# Patient Record
Sex: FEMALE | Race: BLACK OR AFRICAN AMERICAN
[De-identification: names, ages, dates, MRNs, and addresses within clinical notes are randomized per-mention and may not be internally consistent; named-entity substitution may affect disease eponyms.]

---

## 2017-01-10 ENCOUNTER — OTHER (OUTPATIENT)
Age: 67
End: 2017-01-10

## 2017-01-30 ENCOUNTER — OTHER (OUTPATIENT)
Age: 67
End: 2017-01-30

## 2017-03-01 ENCOUNTER — EMERGENCY (EMERGENCY)
Facility: HOSPITAL | Age: 67
LOS: 0 days | Discharge: LEFT BEFORE DISCHARGE | End: 2017-03-01
Attending: EMERGENCY MEDICINE | Admitting: EMERGENCY MEDICINE
Payer: COMMERCIAL

## 2017-03-01 VITALS
SYSTOLIC BLOOD PRESSURE: 127 MMHG | RESPIRATION RATE: 18 BRPM | HEIGHT: 65 IN | HEART RATE: 71 BPM | TEMPERATURE: 98 F | OXYGEN SATURATION: 100 % | WEIGHT: 160.06 LBS | DIASTOLIC BLOOD PRESSURE: 73 MMHG

## 2017-03-01 VITALS
RESPIRATION RATE: 16 BRPM | SYSTOLIC BLOOD PRESSURE: 122 MMHG | DIASTOLIC BLOOD PRESSURE: 79 MMHG | OXYGEN SATURATION: 99 % | HEART RATE: 78 BPM

## 2017-03-01 DIAGNOSIS — Z92.89 PERSONAL HISTORY OF OTHER MEDICAL TREATMENT: Chronic | ICD-10-CM

## 2017-03-01 DIAGNOSIS — K57.92 DIVERTICULITIS OF INTESTINE, PART UNSPECIFIED, WITHOUT PERFORATION OR ABSCESS WITHOUT BLEEDING: ICD-10-CM

## 2017-03-01 DIAGNOSIS — R07.9 CHEST PAIN, UNSPECIFIED: ICD-10-CM

## 2017-03-01 DIAGNOSIS — I10 ESSENTIAL (PRIMARY) HYPERTENSION: ICD-10-CM

## 2017-03-01 LAB
ALBUMIN SERPL ELPH-MCNC: 3.5 G/DL — SIGNIFICANT CHANGE UP (ref 3.3–5)
ALP SERPL-CCNC: 86 U/L — SIGNIFICANT CHANGE UP (ref 40–120)
ALT FLD-CCNC: 21 U/L — SIGNIFICANT CHANGE UP (ref 12–78)
ANION GAP SERPL CALC-SCNC: 9 MMOL/L — SIGNIFICANT CHANGE UP (ref 5–17)
APTT BLD: 30.7 SEC — SIGNIFICANT CHANGE UP (ref 27.5–37.4)
AST SERPL-CCNC: 16 U/L — SIGNIFICANT CHANGE UP (ref 15–37)
BASOPHILS # BLD AUTO: 0.1 K/UL — SIGNIFICANT CHANGE UP (ref 0–0.2)
BASOPHILS NFR BLD AUTO: 1.1 % — SIGNIFICANT CHANGE UP (ref 0–2)
BILIRUB SERPL-MCNC: 0.3 MG/DL — SIGNIFICANT CHANGE UP (ref 0.2–1.2)
BUN SERPL-MCNC: 11 MG/DL — SIGNIFICANT CHANGE UP (ref 7–23)
CALCIUM SERPL-MCNC: 9.2 MG/DL — SIGNIFICANT CHANGE UP (ref 8.5–10.1)
CHLORIDE SERPL-SCNC: 104 MMOL/L — SIGNIFICANT CHANGE UP (ref 96–108)
CO2 SERPL-SCNC: 27 MMOL/L — SIGNIFICANT CHANGE UP (ref 22–31)
CREAT SERPL-MCNC: 0.49 MG/DL — LOW (ref 0.5–1.3)
EOSINOPHIL # BLD AUTO: 0.1 K/UL — SIGNIFICANT CHANGE UP (ref 0–0.5)
EOSINOPHIL NFR BLD AUTO: 1.3 % — SIGNIFICANT CHANGE UP (ref 0–6)
GLUCOSE SERPL-MCNC: 94 MG/DL — SIGNIFICANT CHANGE UP (ref 70–99)
HCT VFR BLD CALC: 40.2 % — SIGNIFICANT CHANGE UP (ref 34.5–45)
HGB BLD-MCNC: 13.2 G/DL — SIGNIFICANT CHANGE UP (ref 11.5–15.5)
INR BLD: 0.97 RATIO — SIGNIFICANT CHANGE UP (ref 0.88–1.16)
LIDOCAIN IGE QN: 107 U/L — SIGNIFICANT CHANGE UP (ref 73–393)
LYMPHOCYTES # BLD AUTO: 2.8 K/UL — SIGNIFICANT CHANGE UP (ref 1–3.3)
LYMPHOCYTES # BLD AUTO: 32.5 % — SIGNIFICANT CHANGE UP (ref 13–44)
MCHC RBC-ENTMCNC: 28.9 PG — SIGNIFICANT CHANGE UP (ref 27–34)
MCHC RBC-ENTMCNC: 33 GM/DL — SIGNIFICANT CHANGE UP (ref 32–36)
MCV RBC AUTO: 87.8 FL — SIGNIFICANT CHANGE UP (ref 80–100)
MONOCYTES # BLD AUTO: 0.4 K/UL — SIGNIFICANT CHANGE UP (ref 0–0.9)
MONOCYTES NFR BLD AUTO: 4.9 % — SIGNIFICANT CHANGE UP (ref 2–14)
NEUTROPHILS # BLD AUTO: 5.3 K/UL — SIGNIFICANT CHANGE UP (ref 1.8–7.4)
NEUTROPHILS NFR BLD AUTO: 60.2 % — SIGNIFICANT CHANGE UP (ref 43–77)
NT-PROBNP SERPL-SCNC: 48 PG/ML — SIGNIFICANT CHANGE UP (ref 0–125)
PLATELET # BLD AUTO: 257 K/UL — SIGNIFICANT CHANGE UP (ref 150–400)
POTASSIUM SERPL-MCNC: 4.1 MMOL/L — SIGNIFICANT CHANGE UP (ref 3.5–5.3)
POTASSIUM SERPL-SCNC: 4.1 MMOL/L — SIGNIFICANT CHANGE UP (ref 3.5–5.3)
PROT SERPL-MCNC: 7.3 GM/DL — SIGNIFICANT CHANGE UP (ref 6–8.3)
PROTHROM AB SERPL-ACNC: 10.7 SEC — SIGNIFICANT CHANGE UP (ref 10–13.1)
RBC # BLD: 4.58 M/UL — SIGNIFICANT CHANGE UP (ref 3.8–5.2)
RBC # FLD: 12.3 % — SIGNIFICANT CHANGE UP (ref 10.3–14.5)
SODIUM SERPL-SCNC: 140 MMOL/L — SIGNIFICANT CHANGE UP (ref 135–145)
TROPONIN I SERPL-MCNC: <0.015 NG/ML — SIGNIFICANT CHANGE UP (ref 0.01–0.04)
WBC # BLD: 8.8 K/UL — SIGNIFICANT CHANGE UP (ref 3.8–10.5)
WBC # FLD AUTO: 8.8 K/UL — SIGNIFICANT CHANGE UP (ref 3.8–10.5)

## 2017-03-01 PROCEDURE — 93010 ELECTROCARDIOGRAM REPORT: CPT

## 2017-03-01 PROCEDURE — 71020: CPT | Mod: 26

## 2017-03-01 PROCEDURE — 99284 EMERGENCY DEPT VISIT MOD MDM: CPT

## 2017-03-01 RX ORDER — SODIUM CHLORIDE 9 MG/ML
3 INJECTION INTRAMUSCULAR; INTRAVENOUS; SUBCUTANEOUS ONCE
Qty: 0 | Refills: 0 | Status: COMPLETED | OUTPATIENT
Start: 2017-03-01 | End: 2017-03-01

## 2017-03-01 RX ADMIN — SODIUM CHLORIDE 3 MILLILITER(S): 9 INJECTION INTRAMUSCULAR; INTRAVENOUS; SUBCUTANEOUS at 17:00

## 2017-03-01 NOTE — ED STATDOCS - PROGRESS NOTE DETAILS
65 y/o F PMHx bowel resection d/t abcess and fistula in May, herinoraphy, HTN, presents to the ED c/o CP. The pt provides that she started having sharp left sided chest and shoulder and back pain, on exacerbation. No h/o sob, abd pain, nvd, headache, fever, chills, dizziness, dysuria or urinary incontinence. Furhter evaluation in Main ED

## 2017-03-01 NOTE — ED PROVIDER NOTE - CHPI ED SYMPTOMS NEG
no diaphoresis/no dizziness/no shortness of breath/no vomiting/no nausea/no syncope/no cough/no fever/no chills

## 2017-03-01 NOTE — ED PROVIDER NOTE - DETAILS:
I, Shereen Greco, performed the initial face to face bedside interview with this patient regarding history of present illness, review of symptoms and relevant past medical, social and family history.  I completed an independent physical examination.  I was the initial provider who evaluated this patient.  The history, relevant review of systems, past medical and surgical history, medical decision making, and physical examination was documented by the scribe in my presence and I attest to the accuracy of the documentation.

## 2017-03-01 NOTE — ED PROVIDER NOTE - MEDICAL DECISION MAKING DETAILS
Atypical CP, constant since Saturday, positional in nature. No risk factors or MI. Will check one set of cardiac enzymes and refer back to [facility] if negative.

## 2017-03-01 NOTE — ED PROVIDER NOTE - MUSCULOSKELETAL, MLM
Spine appears normal, range of motion is not limited. No pedal edema or calf tenderness. Tenderness over left trapezius and paracervical muscles and left lateral pectoral muscles.

## 2017-03-01 NOTE — ED PROVIDER NOTE - OBJECTIVE STATEMENT
65 y/o F with PMHx bowel resection d/t abscess and fistula in May, herniography, HTN, diverticulitis, presents to the ED c/o acute constant left sided CP radiating to left neck, left shoulder and left back x4 days. Pt states pain is worsened with movement. Pt states she saw a cardio NP today for x3 years of CP (different from the pain that started x4 days ago). Pt does states that she thought the pain was a pulled muscle but was worried. Pt states she took a valium this morning. Pt also states she smokes cigarettes .5 ppd. No N/V/D, fevers, palpitations, dizziness, SOB, abd pain, edema. PMD Dominik Frazier. Cardio [facility]. 65 y/o F with PMHx bowel resection d/t abscess and fistula in May, herniography, HTN, diverticulitis, fibromyalgia, presents to the ED c/o acute constant left sided CP radiating to left neck, left shoulder and left back x4 days. Pt states pain is worsened with movement. Pt states she saw a cardio NP today for x3 years of CP (different from the pain that started x4 days ago). Pt does states that she thought the pain was a pulled muscle after she switched to a heavier purse and carried some heavy groceries. but was worried. Pt states she took a valium this morning. Pt also states she smokes cigarettes .5 ppd. No N/V/D, fevers, palpitations, dizziness, SOB, abd pain, edema. PMD Dominik Frazier. Cardio [facility]. Pt states she has a h/o TWI and had a cath in 2002 and a stress 1.5 years ago, which were both negative. Denies travel/ leg pain/ calf swelling.

## 2017-03-01 NOTE — ED PROVIDER NOTE - PROGRESS NOTE DETAILS
I discussed preliminary results with patient and found a pink slip from ramonita's office requesting 2 sets of CE - I relayed this to patient and she states she does not want to wait for a 2nd set of enzymes given that her pain has been constant and seems musculoskeletal. I expressed desire for a 2nd set and stated that patient would need to signout AMA if she chose to leave prior to the 2nd set her cardiologist was recommending.  The pt is clinically sober, A&Ox3, free from distracting injury.  Throughout our interactions in the ED today, the pt has demonstrated concrete thinking/reasoning, has maintained an orderly/reasonable conversation, appears to have intact insight/judgment/reason, a sound mind and therefore in our opinion has capacity now to make decisions.  Given the pt’s presentation, we communicated (in laymen's terms) our concern for  acute coronary syndrome.  The pt verbalized an understanding of our worries.  We’ve communicated to the patient that the ED evaluation is incomplete & many troublesome conditions haven’t been r/o.  We have discussed the need for further ED w/u so we can get more information about the pt’s condition.  We have discussed the range of possible dx, potential testing & tx options.  Our discussions included the potential outcomes of leaving AMA, including worsening of their condition, becoming permanently disabled/in pain/critically ill, or death.  Despite these efforts, we were unable to convince the pt to stay.  The pt is refusing any further care and is leaving against medical advice. We have attempted to offer tx/rx/guidance for any dangerous conditions which are most likely and/or dangerous.  We have answered all questions and have implored the pt to return ASAP to complete the w/u.

## 2017-03-01 NOTE — ED ADULT NURSE NOTE - CHPI ED SYMPTOMS NEG
no fever/no shortness of breath/no diaphoresis/no vomiting/no nausea/no syncope/no back pain/no cough/no chills/no dizziness

## 2017-03-01 NOTE — ED PROVIDER NOTE - NS ED MD SCRIBE ATTENDING SCRIBE SECTIONS
HISTORY OF PRESENT ILLNESS/REVIEW OF SYSTEMS/PAST MEDICAL/SURGICAL/SOCIAL HISTORY/PHYSICAL EXAM/DISPOSITION HISTORY OF PRESENT ILLNESS/RESULTS/PAST MEDICAL/SURGICAL/SOCIAL HISTORY/DISPOSITION/PHYSICAL EXAM/REVIEW OF SYSTEMS

## 2017-03-01 NOTE — ED STATDOCS - NS ED MD SCRIBE ATTENDING SCRIBE SECTIONS
RESULTS/HISTORY OF PRESENT ILLNESS/PHYSICAL EXAM/PROGRESS NOTE/HIV/PAST MEDICAL/SURGICAL/SOCIAL HISTORY/CONSULTATIONS/SHIFT CHANGE/REVIEW OF SYSTEMS/DISPOSITION/INTAKE ASSESSMENT/SCREENINGS

## 2017-05-05 ENCOUNTER — APPOINTMENT (OUTPATIENT)
Dept: COLORECTAL SURGERY | Facility: CLINIC | Age: 67
End: 2017-05-05

## 2017-05-30 ENCOUNTER — APPOINTMENT (OUTPATIENT)
Dept: COLORECTAL SURGERY | Facility: CLINIC | Age: 67
End: 2017-05-30

## 2017-05-30 VITALS
DIASTOLIC BLOOD PRESSURE: 72 MMHG | WEIGHT: 173 LBS | SYSTOLIC BLOOD PRESSURE: 119 MMHG | BODY MASS INDEX: 28.82 KG/M2 | HEART RATE: 62 BPM | HEIGHT: 65 IN | TEMPERATURE: 97.9 F

## 2017-09-20 ENCOUNTER — RESULT REVIEW (OUTPATIENT)
Age: 67
End: 2017-09-20

## 2017-09-29 ENCOUNTER — OUTPATIENT (OUTPATIENT)
Dept: OUTPATIENT SERVICES | Facility: HOSPITAL | Age: 67
LOS: 1 days | End: 2017-09-29
Payer: MEDICARE

## 2017-09-29 ENCOUNTER — APPOINTMENT (OUTPATIENT)
Dept: RADIOLOGY | Facility: CLINIC | Age: 67
End: 2017-09-29
Payer: MEDICARE

## 2017-09-29 DIAGNOSIS — Z00.8 ENCOUNTER FOR OTHER GENERAL EXAMINATION: ICD-10-CM

## 2017-09-29 DIAGNOSIS — Z92.89 PERSONAL HISTORY OF OTHER MEDICAL TREATMENT: Chronic | ICD-10-CM

## 2017-09-29 PROCEDURE — 73030 X-RAY EXAM OF SHOULDER: CPT

## 2017-09-29 PROCEDURE — 73030 X-RAY EXAM OF SHOULDER: CPT | Mod: 26,RT

## 2017-10-18 ENCOUNTER — OUTPATIENT (OUTPATIENT)
Dept: OUTPATIENT SERVICES | Facility: HOSPITAL | Age: 67
LOS: 1 days | End: 2017-10-18
Payer: MEDICARE

## 2017-10-18 ENCOUNTER — APPOINTMENT (OUTPATIENT)
Dept: MRI IMAGING | Facility: CLINIC | Age: 67
End: 2017-10-18
Payer: MEDICARE

## 2017-10-18 DIAGNOSIS — Z00.8 ENCOUNTER FOR OTHER GENERAL EXAMINATION: ICD-10-CM

## 2017-10-18 DIAGNOSIS — Z92.89 PERSONAL HISTORY OF OTHER MEDICAL TREATMENT: Chronic | ICD-10-CM

## 2017-10-18 PROCEDURE — 73221 MRI JOINT UPR EXTREM W/O DYE: CPT

## 2017-10-18 PROCEDURE — 73221 MRI JOINT UPR EXTREM W/O DYE: CPT | Mod: 26,RT

## 2018-01-05 ENCOUNTER — APPOINTMENT (OUTPATIENT)
Dept: COLORECTAL SURGERY | Facility: CLINIC | Age: 68
End: 2018-01-05

## 2018-01-10 ENCOUNTER — APPOINTMENT (OUTPATIENT)
Dept: RADIOLOGY | Facility: CLINIC | Age: 68
End: 2018-01-10
Payer: MEDICARE

## 2018-01-10 ENCOUNTER — OUTPATIENT (OUTPATIENT)
Dept: OUTPATIENT SERVICES | Facility: HOSPITAL | Age: 68
LOS: 1 days | End: 2018-01-10
Payer: COMMERCIAL

## 2018-01-10 DIAGNOSIS — Z00.8 ENCOUNTER FOR OTHER GENERAL EXAMINATION: ICD-10-CM

## 2018-01-10 DIAGNOSIS — Z92.89 PERSONAL HISTORY OF OTHER MEDICAL TREATMENT: Chronic | ICD-10-CM

## 2018-01-10 PROCEDURE — 71046 X-RAY EXAM CHEST 2 VIEWS: CPT

## 2018-01-10 PROCEDURE — 71046 X-RAY EXAM CHEST 2 VIEWS: CPT | Mod: 26

## 2018-02-06 ENCOUNTER — OUTPATIENT (OUTPATIENT)
Dept: OUTPATIENT SERVICES | Facility: HOSPITAL | Age: 68
LOS: 1 days | Discharge: HOME | End: 2018-02-06
Payer: MEDICARE

## 2018-02-06 VITALS
SYSTOLIC BLOOD PRESSURE: 110 MMHG | HEIGHT: 64 IN | OXYGEN SATURATION: 100 % | WEIGHT: 171.08 LBS | TEMPERATURE: 98 F | RESPIRATION RATE: 18 BRPM | HEART RATE: 65 BPM | DIASTOLIC BLOOD PRESSURE: 61 MMHG

## 2018-02-06 DIAGNOSIS — I10 ESSENTIAL (PRIMARY) HYPERTENSION: ICD-10-CM

## 2018-02-06 DIAGNOSIS — Z92.89 PERSONAL HISTORY OF OTHER MEDICAL TREATMENT: Chronic | ICD-10-CM

## 2018-02-06 DIAGNOSIS — K58.9 IRRITABLE BOWEL SYNDROME WITHOUT DIARRHEA: ICD-10-CM

## 2018-02-06 DIAGNOSIS — K21.9 GASTRO-ESOPHAGEAL REFLUX DISEASE WITHOUT ESOPHAGITIS: ICD-10-CM

## 2018-02-06 DIAGNOSIS — Z96.7 PRESENCE OF OTHER BONE AND TENDON IMPLANTS: Chronic | ICD-10-CM

## 2018-02-06 DIAGNOSIS — Z98.890 OTHER SPECIFIED POSTPROCEDURAL STATES: Chronic | ICD-10-CM

## 2018-02-06 DIAGNOSIS — M75.121 COMPLETE ROTATOR CUFF TEAR OR RUPTURE OF RIGHT SHOULDER, NOT SPECIFIED AS TRAUMATIC: ICD-10-CM

## 2018-02-06 DIAGNOSIS — Z01.818 ENCOUNTER FOR OTHER PREPROCEDURAL EXAMINATION: ICD-10-CM

## 2018-02-06 DIAGNOSIS — M79.7 FIBROMYALGIA: ICD-10-CM

## 2018-02-06 DIAGNOSIS — M75.51 BURSITIS OF RIGHT SHOULDER: ICD-10-CM

## 2018-02-06 DIAGNOSIS — M25.811 OTHER SPECIFIED JOINT DISORDERS, RIGHT SHOULDER: ICD-10-CM

## 2018-02-06 DIAGNOSIS — M75.111 INCOMPLETE ROTATOR CUFF TEAR OR RUPTURE OF RIGHT SHOULDER, NOT SPECIFIED AS TRAUMATIC: ICD-10-CM

## 2018-02-06 LAB
ANION GAP SERPL CALC-SCNC: 6 MMOL/L — SIGNIFICANT CHANGE UP (ref 5–17)
BASOPHILS # BLD AUTO: 0.1 K/UL — SIGNIFICANT CHANGE UP (ref 0–0.2)
BASOPHILS NFR BLD AUTO: 1.5 % — SIGNIFICANT CHANGE UP (ref 0–2)
BUN SERPL-MCNC: 15 MG/DL — SIGNIFICANT CHANGE UP (ref 7–23)
CALCIUM SERPL-MCNC: 9 MG/DL — SIGNIFICANT CHANGE UP (ref 8.5–10.1)
CHLORIDE SERPL-SCNC: 108 MMOL/L — SIGNIFICANT CHANGE UP (ref 96–108)
CO2 SERPL-SCNC: 25 MMOL/L — SIGNIFICANT CHANGE UP (ref 22–31)
CREAT SERPL-MCNC: 0.43 MG/DL — LOW (ref 0.5–1.3)
EOSINOPHIL # BLD AUTO: 0.1 K/UL — SIGNIFICANT CHANGE UP (ref 0–0.5)
EOSINOPHIL NFR BLD AUTO: 1.3 % — SIGNIFICANT CHANGE UP (ref 0–6)
GLUCOSE SERPL-MCNC: 92 MG/DL — SIGNIFICANT CHANGE UP (ref 70–99)
HCT VFR BLD CALC: 38.2 % — SIGNIFICANT CHANGE UP (ref 34.5–45)
HGB BLD-MCNC: 12.5 G/DL — SIGNIFICANT CHANGE UP (ref 11.5–15.5)
LYMPHOCYTES # BLD AUTO: 2.3 K/UL — SIGNIFICANT CHANGE UP (ref 1–3.3)
LYMPHOCYTES # BLD AUTO: 34.6 % — SIGNIFICANT CHANGE UP (ref 13–44)
MCHC RBC-ENTMCNC: 28.7 PG — SIGNIFICANT CHANGE UP (ref 27–34)
MCHC RBC-ENTMCNC: 32.8 GM/DL — SIGNIFICANT CHANGE UP (ref 32–36)
MCV RBC AUTO: 87.6 FL — SIGNIFICANT CHANGE UP (ref 80–100)
MONOCYTES # BLD AUTO: 0.4 K/UL — SIGNIFICANT CHANGE UP (ref 0–0.9)
MONOCYTES NFR BLD AUTO: 6.8 % — SIGNIFICANT CHANGE UP (ref 2–14)
NEUTROPHILS # BLD AUTO: 3.7 K/UL — SIGNIFICANT CHANGE UP (ref 1.8–7.4)
NEUTROPHILS NFR BLD AUTO: 55.9 % — SIGNIFICANT CHANGE UP (ref 43–77)
PLATELET # BLD AUTO: 218 K/UL — SIGNIFICANT CHANGE UP (ref 150–400)
POTASSIUM SERPL-MCNC: 4 MMOL/L — SIGNIFICANT CHANGE UP (ref 3.5–5.3)
POTASSIUM SERPL-SCNC: 4 MMOL/L — SIGNIFICANT CHANGE UP (ref 3.5–5.3)
RBC # BLD: 4.36 M/UL — SIGNIFICANT CHANGE UP (ref 3.8–5.2)
RBC # FLD: 12.5 % — SIGNIFICANT CHANGE UP (ref 10.3–14.5)
SODIUM SERPL-SCNC: 139 MMOL/L — SIGNIFICANT CHANGE UP (ref 135–145)
WBC # BLD: 6.5 K/UL — SIGNIFICANT CHANGE UP (ref 3.8–10.5)
WBC # FLD AUTO: 6.5 K/UL — SIGNIFICANT CHANGE UP (ref 3.8–10.5)

## 2018-02-06 PROCEDURE — 93010 ELECTROCARDIOGRAM REPORT: CPT

## 2018-02-06 NOTE — H&P PST ADULT - NSANTHOSAYNRD_GEN_A_CORE
No. LOVELY screening performed.  STOP BANG Legend: 0-2 = LOW Risk; 3-4 = INTERMEDIATE Risk; 5-8 = HIGH Risk

## 2018-02-06 NOTE — H&P PST ADULT - PMH
Bilateral recurrent inguinal hernia without obstruction or gangrene    Cysts of both ovaries    Depression    Diverticulitis    Gall bladder disease    Gastroesophageal reflux disease without esophagitis    Headache    Hematuria    Hemorrhoids    Herniated disc, cervical  and lumbar  HTN (hypertension)    Irritable bowel syndrome with diarrhea    Osteopenia    Pain  hand and wrist  Recurrent ventral hernia    Spondylosis    Spondylosis    Tear of right rotator cuff, unspecified tear extent    Uterine leiomyoma, unspecified location  History of  Yeast infection

## 2018-02-06 NOTE — H&P PST ADULT - HISTORY OF PRESENT ILLNESS
67 years old female with right shoulder for rotator cuff tear. Admits to pain to right shoulder since spring, 2017. Admits to progressive pain which is now constant and worst in the morning. pain radiates to forearm and to the right side of neck. Planned shoulder arthroscopy.

## 2018-02-06 NOTE — H&P PST ADULT - PSH
H/O abdominal hysterectomy    H/O bilateral oophorectomy    H/O microdiscectomy  x 2 , 2004  H/O ventral hernia repair    History of bowel resection  with ileostomy  History of cholecystectomy    S/P ORIF (open reduction internal fixation) fracture  right wrist

## 2018-02-06 NOTE — H&P PST ADULT - ASSESSMENT
Female present to PST prior to right shoulder arthroscopy, right rotator cuff repair with Dr. Khan.  Plan   1. NPO after midnight  2. Take the following medications with sips of water on the day of procedure: Lopressor  3. Use E-Z sponge as directed  4. Drink a quart of extra  fluids the day before your surgery.  5. Medical clearance with Dr. Frazier  6. CBC, BMP,  sent to lab  7. Chest x-ray on chart  8. EKG done

## 2018-02-06 NOTE — H&P PST ADULT - FAMILY HISTORY
Mother  Still living? No  Family history of melanoma, Age at diagnosis: Age Unknown  Family history of Alzheimer's disease, Age at diagnosis: Age Unknown     Father  Still living? No  Family history of cancer, Age at diagnosis: Age Unknown

## 2018-02-07 ENCOUNTER — OUTPATIENT (OUTPATIENT)
Dept: OUTPATIENT SERVICES | Facility: HOSPITAL | Age: 68
LOS: 1 days | End: 2018-02-07
Payer: MEDICARE

## 2018-02-07 ENCOUNTER — APPOINTMENT (OUTPATIENT)
Dept: MAMMOGRAPHY | Facility: CLINIC | Age: 68
End: 2018-02-07
Payer: MEDICARE

## 2018-02-07 ENCOUNTER — APPOINTMENT (OUTPATIENT)
Dept: ULTRASOUND IMAGING | Facility: CLINIC | Age: 68
End: 2018-02-07
Payer: MEDICARE

## 2018-02-07 DIAGNOSIS — Z96.7 PRESENCE OF OTHER BONE AND TENDON IMPLANTS: Chronic | ICD-10-CM

## 2018-02-07 DIAGNOSIS — Z98.890 OTHER SPECIFIED POSTPROCEDURAL STATES: Chronic | ICD-10-CM

## 2018-02-07 DIAGNOSIS — Z00.8 ENCOUNTER FOR OTHER GENERAL EXAMINATION: ICD-10-CM

## 2018-02-07 PROCEDURE — 77063 BREAST TOMOSYNTHESIS BI: CPT

## 2018-02-07 PROCEDURE — 76641 ULTRASOUND BREAST COMPLETE: CPT | Mod: 26,50

## 2018-02-07 PROCEDURE — 76641 ULTRASOUND BREAST COMPLETE: CPT

## 2018-02-07 PROCEDURE — 77067 SCR MAMMO BI INCL CAD: CPT | Mod: 26

## 2018-02-07 PROCEDURE — 0159T: CPT

## 2018-02-07 PROCEDURE — 77063 BREAST TOMOSYNTHESIS BI: CPT | Mod: 26

## 2018-02-07 PROCEDURE — 77067 SCR MAMMO BI INCL CAD: CPT

## 2018-02-09 NOTE — H&P ADULT - ASSESSMENT
67 year old female with PMHx HTN, back pain, active smoking, abdominal abscess, ventral hernias, presented to cardiologist for preop evaluation for shoulder surgery. Pt. had an abnormal PET scan. Pt. now referred for [facility] for further evaluation. [facility] risks, benefits and alternatives discussed with patient. Risk discussed included, but not limited to MI, stroke, mortality, major bleeding, arrythmia, or infection. Consent obtained and signed educational material provided. Pt. verbalizes and understands pre-procedural instructions.

## 2018-02-09 NOTE — H&P ADULT - HISTORY OF PRESENT ILLNESS
67 year old female with PMHx HTN, back pain, active smoking presented to cardiologist for preop evaluation. 67 year old female with PMHx HTN, back pain, active smoking, abdominal abscess, ventral hernias, presented to cardiologist for preop evaluation for shoulder surgery. Pt. had an abnormal PET scan. Pt. now referred for [facility] for further evaluation. Pt. denies chest pain, SOb, palpitations, lightheadedness, chills, fever.

## 2018-02-09 NOTE — H&P ADULT - NSHPREVIEWOFSYSTEMS_GEN_ALL_CORE
CONSTITUTIONAL: No fever, weight loss, or fatigue  EYES: No eye pain, visual disturbances, or discharge  ENMT:  No difficulty hearing, tinnitus, vertigo; No sinus or throat pain  NECK: No pain or stiffness  BREASTS: No pain, masses, or nipple discharge  RESPIRATORY: No cough, wheezing, chills or hemoptysis; No shortness of breath  CARDIOVASCULAR: No chest pain, palpitations, dizziness, or leg swelling  GASTROINTESTINAL: +GERD   GENITOURINARY: No dysuria, frequency, hematuria, or incontinence  NEUROLOGICAL: No headaches, memory loss, loss of strength, numbness, or tremors  SKIN: No itching, burning, rashes, or lesions   LYMPH NODES: No enlarged glands  ENDOCRINE: No heat or cold intolerance; No hair loss  MUSCULOSKELETAL: shoulder pain, knee pain,   PSYCHIATRIC: No depression, anxiety, mood swings, or difficulty sleeping  HEME/LYMPH: No easy bruising, or bleeding gums  ALLERGY AND IMMUNOLOGIC: No hives or eczema

## 2018-02-12 ENCOUNTER — OUTPATIENT (OUTPATIENT)
Dept: OUTPATIENT SERVICES | Facility: HOSPITAL | Age: 68
LOS: 1 days | Discharge: HOME | End: 2018-02-12
Payer: MEDICARE

## 2018-02-12 VITALS
DIASTOLIC BLOOD PRESSURE: 78 MMHG | WEIGHT: 169.98 LBS | HEIGHT: 64 IN | RESPIRATION RATE: 16 BRPM | OXYGEN SATURATION: 100 % | SYSTOLIC BLOOD PRESSURE: 145 MMHG | HEART RATE: 64 BPM

## 2018-02-12 VITALS
OXYGEN SATURATION: 96 % | SYSTOLIC BLOOD PRESSURE: 108 MMHG | RESPIRATION RATE: 16 BRPM | DIASTOLIC BLOOD PRESSURE: 83 MMHG | HEART RATE: 65 BPM

## 2018-02-12 DIAGNOSIS — Z98.890 OTHER SPECIFIED POSTPROCEDURAL STATES: Chronic | ICD-10-CM

## 2018-02-12 DIAGNOSIS — Z96.7 PRESENCE OF OTHER BONE AND TENDON IMPLANTS: Chronic | ICD-10-CM

## 2018-02-12 PROCEDURE — 93010 ELECTROCARDIOGRAM REPORT: CPT

## 2018-02-12 RX ORDER — SODIUM CHLORIDE 9 MG/ML
1000 INJECTION INTRAMUSCULAR; INTRAVENOUS; SUBCUTANEOUS
Qty: 0 | Refills: 0 | Status: DISCONTINUED | OUTPATIENT
Start: 2018-02-12 | End: 2018-02-27

## 2018-02-12 NOTE — CHART NOTE - NSCHARTNOTEFT_GEN_A_CORE
Nurse Practitioner Progress note:     HPI:    T(C): --  HR: 64 (02-12-18 @ 09:40) (63 - 64)  BP: 119/55 (02-12-18 @ 09:40) (119/55 - 145/78)  RR: 16 (02-12-18 @ 09:40) (16 - 16)  SpO2: 94% (02-12-18 @ 09:40) (94% - 100%)  Wt(kg): --    PHYSICAL EXAM:  Neurologic: Non-focal, AxOx3.  No neuro deficits  Vascular: Peripheral pulses palpable 2+ bilaterally  Procedure Site: Rt. groin perclose closure device noted site benign soft no bleeding no hematoma +1PP      PROCEDURE RESULTS:   Cardiac Cath Lab - Adult (02.12.18 @ 09:30) >   Diagnostic Conclusions   Normal coronary arteries.        ASSESSMENT/PLAN:     67 year old female with PMHx HTN, back pain, active smoking, abdominal abscess, ventral hernias, presented to cardiologist for preop evaluation for shoulder surgery. Pt. had an abnormal PET scan. S/P C     -Cleared for planned surgery.  -Manage with medical therapy.	  -VS, labs, diet, activity as per post cath orders  -IV hydration  -Encourage PO fluids  -Continue current medications  -Pt. to be discharged home today  -Plan of care D/W pt. and MD  -Post cath instructions reviewed with pt., pt. verbalizes and understands instructions  -Follow-up with attending

## 2018-02-16 ENCOUNTER — OUTPATIENT (OUTPATIENT)
Dept: OUTPATIENT SERVICES | Facility: HOSPITAL | Age: 68
LOS: 1 days | Discharge: HOME | End: 2018-02-16
Payer: MEDICARE

## 2018-02-16 ENCOUNTER — RESULT REVIEW (OUTPATIENT)
Age: 68
End: 2018-02-16

## 2018-02-16 VITALS
WEIGHT: 171.08 LBS | TEMPERATURE: 98 F | HEIGHT: 64 IN | OXYGEN SATURATION: 98 % | RESPIRATION RATE: 16 BRPM | SYSTOLIC BLOOD PRESSURE: 131 MMHG | HEART RATE: 63 BPM | DIASTOLIC BLOOD PRESSURE: 77 MMHG

## 2018-02-16 VITALS
HEART RATE: 70 BPM | RESPIRATION RATE: 16 BRPM | SYSTOLIC BLOOD PRESSURE: 137 MMHG | OXYGEN SATURATION: 100 % | DIASTOLIC BLOOD PRESSURE: 71 MMHG | TEMPERATURE: 98 F

## 2018-02-16 DIAGNOSIS — M75.101 UNSPECIFIED ROTATOR CUFF TEAR OR RUPTURE OF RIGHT SHOULDER, NOT SPECIFIED AS TRAUMATIC: ICD-10-CM

## 2018-02-16 DIAGNOSIS — Z98.890 OTHER SPECIFIED POSTPROCEDURAL STATES: Chronic | ICD-10-CM

## 2018-02-16 DIAGNOSIS — Z90.49 ACQUIRED ABSENCE OF OTHER SPECIFIED PARTS OF DIGESTIVE TRACT: ICD-10-CM

## 2018-02-16 DIAGNOSIS — E66.9 OBESITY, UNSPECIFIED: ICD-10-CM

## 2018-02-16 DIAGNOSIS — Z96.7 PRESENCE OF OTHER BONE AND TENDON IMPLANTS: Chronic | ICD-10-CM

## 2018-02-16 DIAGNOSIS — E78.00 PURE HYPERCHOLESTEROLEMIA, UNSPECIFIED: ICD-10-CM

## 2018-02-16 DIAGNOSIS — R93.1 ABNORMAL FINDINGS ON DIAGNOSTIC IMAGING OF HEART AND CORONARY CIRCULATION: ICD-10-CM

## 2018-02-16 DIAGNOSIS — K21.9 GASTRO-ESOPHAGEAL REFLUX DISEASE WITHOUT ESOPHAGITIS: ICD-10-CM

## 2018-02-16 DIAGNOSIS — Z01.810 ENCOUNTER FOR PREPROCEDURAL CARDIOVASCULAR EXAMINATION: ICD-10-CM

## 2018-02-16 DIAGNOSIS — K58.0 IRRITABLE BOWEL SYNDROME WITH DIARRHEA: ICD-10-CM

## 2018-02-16 DIAGNOSIS — I10 ESSENTIAL (PRIMARY) HYPERTENSION: ICD-10-CM

## 2018-02-16 DIAGNOSIS — M85.80 OTHER SPECIFIED DISORDERS OF BONE DENSITY AND STRUCTURE, UNSPECIFIED SITE: ICD-10-CM

## 2018-02-16 DIAGNOSIS — Z90.710 ACQUIRED ABSENCE OF BOTH CERVIX AND UTERUS: ICD-10-CM

## 2018-02-16 DIAGNOSIS — F17.210 NICOTINE DEPENDENCE, CIGARETTES, UNCOMPLICATED: ICD-10-CM

## 2018-02-16 PROCEDURE — 88304 TISSUE EXAM BY PATHOLOGIST: CPT | Mod: 26

## 2018-02-16 RX ORDER — ASPIRIN/CALCIUM CARB/MAGNESIUM 324 MG
1 TABLET ORAL
Qty: 14 | Refills: 0
Start: 2018-02-16 | End: 2018-03-01

## 2018-02-16 RX ORDER — OXYCODONE AND ACETAMINOPHEN 5; 325 MG/1; MG/1
1 TABLET ORAL
Qty: 30 | Refills: 0
Start: 2018-02-16

## 2018-02-16 RX ORDER — SODIUM CHLORIDE 9 MG/ML
1000 INJECTION INTRAMUSCULAR; INTRAVENOUS; SUBCUTANEOUS
Qty: 0 | Refills: 0 | Status: DISCONTINUED | OUTPATIENT
Start: 2018-02-16 | End: 2018-02-16

## 2018-02-16 RX ORDER — FENTANYL CITRATE 50 UG/ML
50 INJECTION INTRAVENOUS
Qty: 0 | Refills: 0 | Status: DISCONTINUED | OUTPATIENT
Start: 2018-02-16 | End: 2018-02-16

## 2018-02-16 RX ORDER — OXYCODONE HYDROCHLORIDE 5 MG/1
5 TABLET ORAL EVERY 4 HOURS
Qty: 0 | Refills: 0 | Status: DISCONTINUED | OUTPATIENT
Start: 2018-02-16 | End: 2018-02-16

## 2018-02-16 RX ORDER — ACETAMINOPHEN 500 MG
1000 TABLET ORAL ONCE
Qty: 0 | Refills: 0 | Status: COMPLETED | OUTPATIENT
Start: 2018-02-16 | End: 2018-02-16

## 2018-02-16 RX ORDER — ONDANSETRON 8 MG/1
4 TABLET, FILM COATED ORAL ONCE
Qty: 0 | Refills: 0 | Status: DISCONTINUED | OUTPATIENT
Start: 2018-02-16 | End: 2018-02-16

## 2018-02-16 RX ADMIN — Medication 400 MILLIGRAM(S): at 11:26

## 2018-02-16 NOTE — PROGRESS NOTE ADULT - SUBJECTIVE AND OBJECTIVE BOX
_Right_____ Interscalene Nerve Block Note:  Time out performed, pt awake and alert, sterile prep with chlorhexidine and drape, ultrasound-guided, 21G 2" stimuplex needle, good visualization of needle and nerve at all times, 20cc of 0.5% Ropivacaine and 10cc of 2% of Lidocaine injected easily, no heme after aspirating every 5cc, no intraneural injection, no paresthesia.  Procedure well tolerated without complications.

## 2018-02-16 NOTE — ASU DISCHARGE PLAN (ADULT/PEDIATRIC). - NOTIFY
Numbness, tingling/Numbness, color, or temperature change to extremity/Swelling that continues/Bleeding that does not stop/Fever greater than 101/Pain not relieved by Medications

## 2018-02-16 NOTE — ASU DISCHARGE PLAN (ADULT/PEDIATRIC). - ACTIVITY LEVEL
no weight bearing/Keep right arm in shoulder immobilizer at all times Sling as needed for comfort/weight bearing as tolerated

## 2018-02-16 NOTE — ASU DISCHARGE PLAN (ADULT/PEDIATRIC). - MEDICATION SUMMARY - MEDICATIONS TO TAKE
I will START or STAY ON the medications listed below when I get home from the hospital:    Percocet 5/325 oral tablet  -- 1 tab(s) by mouth every 4 hours, As Needed -for severe pain MDD:6  -- Caution federal law prohibits the transfer of this drug to any person other  than the person for whom it was prescribed.  May cause drowsiness.  Alcohol may intensify this effect.  Use care when operating dangerous machinery.  This prescription cannot be refilled.  This product contains acetaminophen.  Do not use  with any other product containing acetaminophen to prevent possible liver damage.  Using more of this medication than prescribed may cause serious breathing problems.    -- Indication: For prn pain    Ecotrin 325 mg oral delayed release tablet  -- 1 tab(s) by mouth once a day MDD:1  -- Swallow whole.  Do not crush.  Take with food or milk.    -- Indication: For dvt ppx    diazePAM 10 mg oral tablet  -- 1 tab(s) by mouth once a day (at bedtime), As Needed  -- Indication: For per pmd    fluconazole 150 mg oral tablet  -- 1 tab(s) by mouth once a week  -- Indication: For per pmd    Dyazide 37.5 mg-25 mg oral capsule  -- 1 cap(s) by mouth once a day (in the morning)  -- Indication: For per pmd    Lopressor  -- 25 milligram(s) by mouth once a day (in the morning)  -- Indication: For per pmd    PriLOSEC 40 mg oral delayed release capsule  -- 1 cap(s) by mouth once a day (in the morning)  -- Indication: For per pmd    Premarin 0.625 mg oral tablet  -- 1 tab(s) by mouth once a day (in the morning)  -- Indication: For per pmd    Vitamin D3 2000 intl units oral tablet  -- 1 tab(s) by mouth once a day  -- Indication: For per pmd    biotin 5000 mcg oral tablet, disintegrating  -- 1 tab(s) by mouth once a day  -- Indication: For per pmd

## 2018-02-16 NOTE — ASU PATIENT PROFILE, ADULT - VISION (WITH CORRECTIVE LENSES IF THE PATIENT USUALLY WEARS THEM):
Normal vision: sees adequately in most situations; can see medication labels, newsprint glasses for distance and reading/Partially impaired: cannot see medication labels or newsprint, but can see obstacles in path, and the surrounding layout; can count fingers at arm's length

## 2018-02-21 LAB — SURGICAL PATHOLOGY FINAL REPORT - CH: SIGNIFICANT CHANGE UP

## 2018-02-23 DIAGNOSIS — M50.30 OTHER CERVICAL DISC DEGENERATION, UNSPECIFIED CERVICAL REGION: ICD-10-CM

## 2018-02-23 DIAGNOSIS — M75.111 INCOMPLETE ROTATOR CUFF TEAR OR RUPTURE OF RIGHT SHOULDER, NOT SPECIFIED AS TRAUMATIC: ICD-10-CM

## 2018-02-23 DIAGNOSIS — I10 ESSENTIAL (PRIMARY) HYPERTENSION: ICD-10-CM

## 2018-02-23 DIAGNOSIS — R53.82 CHRONIC FATIGUE, UNSPECIFIED: ICD-10-CM

## 2018-02-23 DIAGNOSIS — M75.51 BURSITIS OF RIGHT SHOULDER: ICD-10-CM

## 2018-02-23 DIAGNOSIS — K58.9 IRRITABLE BOWEL SYNDROME WITHOUT DIARRHEA: ICD-10-CM

## 2018-02-23 DIAGNOSIS — M79.7 FIBROMYALGIA: ICD-10-CM

## 2018-02-23 DIAGNOSIS — Z90.710 ACQUIRED ABSENCE OF BOTH CERVIX AND UTERUS: ICD-10-CM

## 2018-02-23 DIAGNOSIS — Z90.49 ACQUIRED ABSENCE OF OTHER SPECIFIED PARTS OF DIGESTIVE TRACT: ICD-10-CM

## 2018-02-23 DIAGNOSIS — E78.2 MIXED HYPERLIPIDEMIA: ICD-10-CM

## 2018-02-23 DIAGNOSIS — E66.9 OBESITY, UNSPECIFIED: ICD-10-CM

## 2018-02-23 DIAGNOSIS — M25.811 OTHER SPECIFIED JOINT DISORDERS, RIGHT SHOULDER: ICD-10-CM

## 2018-02-23 DIAGNOSIS — Z90.721 ACQUIRED ABSENCE OF OVARIES, UNILATERAL: ICD-10-CM

## 2018-02-23 DIAGNOSIS — K21.9 GASTRO-ESOPHAGEAL REFLUX DISEASE WITHOUT ESOPHAGITIS: ICD-10-CM

## 2018-04-10 ENCOUNTER — APPOINTMENT (OUTPATIENT)
Dept: COLORECTAL SURGERY | Facility: CLINIC | Age: 68
End: 2018-04-10
Payer: MEDICARE

## 2018-04-10 VITALS
BODY MASS INDEX: 28.82 KG/M2 | DIASTOLIC BLOOD PRESSURE: 80 MMHG | SYSTOLIC BLOOD PRESSURE: 145 MMHG | HEIGHT: 65 IN | HEART RATE: 76 BPM | WEIGHT: 173 LBS

## 2018-04-10 DIAGNOSIS — K43.2 INCISIONAL HERNIA W/OUT OBSTRUCTION OR GANGRENE: ICD-10-CM

## 2018-04-10 DIAGNOSIS — R10.9 UNSPECIFIED ABDOMINAL PAIN: ICD-10-CM

## 2018-04-10 PROCEDURE — 99214 OFFICE O/P EST MOD 30 MIN: CPT

## 2018-04-17 ENCOUNTER — OUTPATIENT (OUTPATIENT)
Dept: OUTPATIENT SERVICES | Facility: HOSPITAL | Age: 68
LOS: 1 days | End: 2018-04-17
Payer: MEDICARE

## 2018-04-17 ENCOUNTER — APPOINTMENT (OUTPATIENT)
Dept: CT IMAGING | Facility: CLINIC | Age: 68
End: 2018-04-17
Payer: MEDICARE

## 2018-04-17 DIAGNOSIS — Z98.890 OTHER SPECIFIED POSTPROCEDURAL STATES: Chronic | ICD-10-CM

## 2018-04-17 DIAGNOSIS — Z96.7 PRESENCE OF OTHER BONE AND TENDON IMPLANTS: Chronic | ICD-10-CM

## 2018-04-17 DIAGNOSIS — Z00.8 ENCOUNTER FOR OTHER GENERAL EXAMINATION: ICD-10-CM

## 2018-04-17 PROCEDURE — 74177 CT ABD & PELVIS W/CONTRAST: CPT | Mod: 26

## 2018-04-17 PROCEDURE — 74177 CT ABD & PELVIS W/CONTRAST: CPT

## 2018-04-19 PROBLEM — K43.2 VENTRAL INCISIONAL HERNIA: Status: ACTIVE | Noted: 2017-06-04

## 2018-05-01 ENCOUNTER — OTHER (OUTPATIENT)
Age: 68
End: 2018-05-01

## 2018-09-08 PROBLEM — K57.92 DIVERTICULITIS OF INTESTINE, PART UNSPECIFIED, WITHOUT PERFORATION OR ABSCESS WITHOUT BLEEDING: Chronic | Status: ACTIVE | Noted: 2017-03-01

## 2018-09-08 PROBLEM — M47.9 SPONDYLOSIS, UNSPECIFIED: Chronic | Status: ACTIVE | Noted: 2018-02-06

## 2018-09-08 PROBLEM — R51 HEADACHE: Chronic | Status: ACTIVE | Noted: 2018-02-06

## 2018-09-08 PROBLEM — K40.21 BILATERAL INGUINAL HERNIA, WITHOUT OBSTRUCTION OR GANGRENE, RECURRENT: Chronic | Status: ACTIVE | Noted: 2018-02-06

## 2018-09-08 PROBLEM — R31.9 HEMATURIA, UNSPECIFIED: Chronic | Status: ACTIVE | Noted: 2018-02-06

## 2018-09-08 PROBLEM — M75.101 UNSPECIFIED ROTATOR CUFF TEAR OR RUPTURE OF RIGHT SHOULDER, NOT SPECIFIED AS TRAUMATIC: Chronic | Status: ACTIVE | Noted: 2018-02-06

## 2018-09-08 PROBLEM — K21.9 GASTRO-ESOPHAGEAL REFLUX DISEASE WITHOUT ESOPHAGITIS: Chronic | Status: ACTIVE | Noted: 2018-02-06

## 2018-09-08 PROBLEM — I10 ESSENTIAL (PRIMARY) HYPERTENSION: Chronic | Status: ACTIVE | Noted: 2017-03-01

## 2018-09-08 PROBLEM — B37.9 CANDIDIASIS, UNSPECIFIED: Chronic | Status: ACTIVE | Noted: 2018-02-06

## 2018-09-08 PROBLEM — F32.9 MAJOR DEPRESSIVE DISORDER, SINGLE EPISODE, UNSPECIFIED: Chronic | Status: ACTIVE | Noted: 2018-02-06

## 2018-09-08 PROBLEM — K58.0 IRRITABLE BOWEL SYNDROME WITH DIARRHEA: Chronic | Status: ACTIVE | Noted: 2018-02-06

## 2018-09-08 PROBLEM — N83.201 UNSPECIFIED OVARIAN CYST, RIGHT SIDE: Chronic | Status: ACTIVE | Noted: 2018-02-06

## 2018-09-08 PROBLEM — K64.9 UNSPECIFIED HEMORRHOIDS: Chronic | Status: ACTIVE | Noted: 2018-02-06

## 2018-09-08 PROBLEM — K43.2 INCISIONAL HERNIA WITHOUT OBSTRUCTION OR GANGRENE: Chronic | Status: ACTIVE | Noted: 2018-02-06

## 2018-09-08 PROBLEM — R52 PAIN, UNSPECIFIED: Chronic | Status: ACTIVE | Noted: 2018-02-06

## 2018-09-08 PROBLEM — M85.80 OTHER SPECIFIED DISORDERS OF BONE DENSITY AND STRUCTURE, UNSPECIFIED SITE: Chronic | Status: ACTIVE | Noted: 2018-02-06

## 2018-09-08 PROBLEM — M50.20 OTHER CERVICAL DISC DISPLACEMENT, UNSPECIFIED CERVICAL REGION: Chronic | Status: ACTIVE | Noted: 2018-02-06

## 2018-09-08 PROBLEM — K82.9 DISEASE OF GALLBLADDER, UNSPECIFIED: Chronic | Status: ACTIVE | Noted: 2018-02-06

## 2018-09-08 PROBLEM — D25.9 LEIOMYOMA OF UTERUS, UNSPECIFIED: Chronic | Status: ACTIVE | Noted: 2018-02-06

## 2018-09-12 ENCOUNTER — APPOINTMENT (OUTPATIENT)
Dept: RADIOLOGY | Facility: CLINIC | Age: 68
End: 2018-09-12
Payer: MEDICARE

## 2018-09-12 ENCOUNTER — OUTPATIENT (OUTPATIENT)
Dept: OUTPATIENT SERVICES | Facility: HOSPITAL | Age: 68
LOS: 1 days | End: 2018-09-12
Payer: MEDICARE

## 2018-09-12 ENCOUNTER — APPOINTMENT (OUTPATIENT)
Dept: MRI IMAGING | Facility: CLINIC | Age: 68
End: 2018-09-12
Payer: MEDICARE

## 2018-09-12 DIAGNOSIS — Z00.8 ENCOUNTER FOR OTHER GENERAL EXAMINATION: ICD-10-CM

## 2018-09-12 DIAGNOSIS — Z98.890 OTHER SPECIFIED POSTPROCEDURAL STATES: Chronic | ICD-10-CM

## 2018-09-12 DIAGNOSIS — Z96.7 PRESENCE OF OTHER BONE AND TENDON IMPLANTS: Chronic | ICD-10-CM

## 2018-09-12 PROCEDURE — 73610 X-RAY EXAM OF ANKLE: CPT

## 2018-09-12 PROCEDURE — 73610 X-RAY EXAM OF ANKLE: CPT | Mod: 26,RT

## 2018-09-12 PROCEDURE — 72148 MRI LUMBAR SPINE W/O DYE: CPT | Mod: 26

## 2018-09-12 PROCEDURE — 72148 MRI LUMBAR SPINE W/O DYE: CPT

## 2018-11-10 ENCOUNTER — APPOINTMENT (OUTPATIENT)
Dept: RADIOLOGY | Facility: CLINIC | Age: 68
End: 2018-11-10
Payer: MEDICARE

## 2018-11-10 ENCOUNTER — OUTPATIENT (OUTPATIENT)
Dept: OUTPATIENT SERVICES | Facility: HOSPITAL | Age: 68
LOS: 1 days | End: 2018-11-10
Payer: MEDICARE

## 2018-11-10 DIAGNOSIS — Z98.890 OTHER SPECIFIED POSTPROCEDURAL STATES: Chronic | ICD-10-CM

## 2018-11-10 DIAGNOSIS — Z96.7 PRESENCE OF OTHER BONE AND TENDON IMPLANTS: Chronic | ICD-10-CM

## 2018-11-10 DIAGNOSIS — Z00.8 ENCOUNTER FOR OTHER GENERAL EXAMINATION: ICD-10-CM

## 2018-11-10 PROCEDURE — 71046 X-RAY EXAM CHEST 2 VIEWS: CPT | Mod: 26

## 2018-11-10 PROCEDURE — 71046 X-RAY EXAM CHEST 2 VIEWS: CPT

## 2018-12-07 ENCOUNTER — APPOINTMENT (OUTPATIENT)
Dept: RADIOLOGY | Facility: CLINIC | Age: 68
End: 2018-12-07
Payer: MEDICARE

## 2018-12-07 ENCOUNTER — OUTPATIENT (OUTPATIENT)
Dept: OUTPATIENT SERVICES | Facility: HOSPITAL | Age: 68
LOS: 1 days | End: 2018-12-07
Payer: MEDICARE

## 2018-12-07 DIAGNOSIS — Z98.890 OTHER SPECIFIED POSTPROCEDURAL STATES: Chronic | ICD-10-CM

## 2018-12-07 DIAGNOSIS — Z96.7 PRESENCE OF OTHER BONE AND TENDON IMPLANTS: Chronic | ICD-10-CM

## 2018-12-07 DIAGNOSIS — Z00.8 ENCOUNTER FOR OTHER GENERAL EXAMINATION: ICD-10-CM

## 2018-12-07 PROCEDURE — 71046 X-RAY EXAM CHEST 2 VIEWS: CPT

## 2018-12-07 PROCEDURE — 71046 X-RAY EXAM CHEST 2 VIEWS: CPT | Mod: 26

## 2019-03-11 ENCOUNTER — EMERGENCY (EMERGENCY)
Facility: HOSPITAL | Age: 69
LOS: 0 days | Discharge: HOME | End: 2019-03-11
Attending: EMERGENCY MEDICINE | Admitting: EMERGENCY MEDICINE
Payer: MEDICARE

## 2019-03-11 VITALS
TEMPERATURE: 99 F | SYSTOLIC BLOOD PRESSURE: 147 MMHG | RESPIRATION RATE: 18 BRPM | HEART RATE: 70 BPM | DIASTOLIC BLOOD PRESSURE: 83 MMHG | OXYGEN SATURATION: 100 %

## 2019-03-11 VITALS — WEIGHT: 175.05 LBS | HEIGHT: 65 IN

## 2019-03-11 DIAGNOSIS — Z87.19 PERSONAL HISTORY OF OTHER DISEASES OF THE DIGESTIVE SYSTEM: ICD-10-CM

## 2019-03-11 DIAGNOSIS — M79.7 FIBROMYALGIA: ICD-10-CM

## 2019-03-11 DIAGNOSIS — R07.9 CHEST PAIN, UNSPECIFIED: ICD-10-CM

## 2019-03-11 DIAGNOSIS — M85.80 OTHER SPECIFIED DISORDERS OF BONE DENSITY AND STRUCTURE, UNSPECIFIED SITE: ICD-10-CM

## 2019-03-11 DIAGNOSIS — Z98.890 OTHER SPECIFIED POSTPROCEDURAL STATES: Chronic | ICD-10-CM

## 2019-03-11 DIAGNOSIS — M47.9 SPONDYLOSIS, UNSPECIFIED: ICD-10-CM

## 2019-03-11 DIAGNOSIS — Z96.7 PRESENCE OF OTHER BONE AND TENDON IMPLANTS: Chronic | ICD-10-CM

## 2019-03-11 DIAGNOSIS — F32.9 MAJOR DEPRESSIVE DISORDER, SINGLE EPISODE, UNSPECIFIED: ICD-10-CM

## 2019-03-11 DIAGNOSIS — Z80.9 FAMILY HISTORY OF MALIGNANT NEOPLASM, UNSPECIFIED: ICD-10-CM

## 2019-03-11 DIAGNOSIS — I44.30 UNSPECIFIED ATRIOVENTRICULAR BLOCK: ICD-10-CM

## 2019-03-11 DIAGNOSIS — R94.31 ABNORMAL ELECTROCARDIOGRAM [ECG] [EKG]: ICD-10-CM

## 2019-03-11 DIAGNOSIS — I10 ESSENTIAL (PRIMARY) HYPERTENSION: ICD-10-CM

## 2019-03-11 DIAGNOSIS — Z90.722 ACQUIRED ABSENCE OF OVARIES, BILATERAL: ICD-10-CM

## 2019-03-11 DIAGNOSIS — M51.26 OTHER INTERVERTEBRAL DISC DISPLACEMENT, LUMBAR REGION: ICD-10-CM

## 2019-03-11 DIAGNOSIS — K21.9 GASTRO-ESOPHAGEAL REFLUX DISEASE WITHOUT ESOPHAGITIS: ICD-10-CM

## 2019-03-11 DIAGNOSIS — Z90.710 ACQUIRED ABSENCE OF BOTH CERVIX AND UTERUS: ICD-10-CM

## 2019-03-11 DIAGNOSIS — F17.210 NICOTINE DEPENDENCE, CIGARETTES, UNCOMPLICATED: ICD-10-CM

## 2019-03-11 DIAGNOSIS — Z82.0 FAMILY HISTORY OF EPILEPSY AND OTHER DISEASES OF THE NERVOUS SYSTEM: ICD-10-CM

## 2019-03-11 LAB
ALBUMIN SERPL ELPH-MCNC: 3.3 G/DL — SIGNIFICANT CHANGE UP (ref 3.3–5)
ALP SERPL-CCNC: 62 U/L — SIGNIFICANT CHANGE UP (ref 40–120)
ALT FLD-CCNC: 18 U/L — SIGNIFICANT CHANGE UP (ref 12–78)
AST SERPL-CCNC: 19 U/L — SIGNIFICANT CHANGE UP (ref 15–37)
BILIRUB SERPL-MCNC: 0.4 MG/DL — SIGNIFICANT CHANGE UP (ref 0.2–1.2)
BUN SERPL-MCNC: 10 MG/DL — SIGNIFICANT CHANGE UP (ref 7–23)
CALCIUM SERPL-MCNC: 8.9 MG/DL — SIGNIFICANT CHANGE UP (ref 8.5–10.1)
CHLORIDE SERPL-SCNC: 104 MMOL/L — SIGNIFICANT CHANGE UP (ref 96–108)
CO2 SERPL-SCNC: 26 MMOL/L — SIGNIFICANT CHANGE UP (ref 22–31)
CREAT SERPL-MCNC: 0.49 MG/DL — LOW (ref 0.5–1.3)
GLUCOSE SERPL-MCNC: 93 MG/DL — SIGNIFICANT CHANGE UP (ref 70–99)
HCT VFR BLD CALC: 36 % — SIGNIFICANT CHANGE UP (ref 34.5–45)
HGB BLD-MCNC: 11.6 G/DL — SIGNIFICANT CHANGE UP (ref 11.5–15.5)
MCHC RBC-ENTMCNC: 27.8 PG — SIGNIFICANT CHANGE UP (ref 27–34)
MCHC RBC-ENTMCNC: 32.2 GM/DL — SIGNIFICANT CHANGE UP (ref 32–36)
MCV RBC AUTO: 86.1 FL — SIGNIFICANT CHANGE UP (ref 80–100)
NRBC # BLD: 0 /100 WBCS — SIGNIFICANT CHANGE UP (ref 0–0)
PLATELET # BLD AUTO: 228 K/UL — SIGNIFICANT CHANGE UP (ref 150–400)
POTASSIUM SERPL-MCNC: 3.8 MMOL/L — SIGNIFICANT CHANGE UP (ref 3.5–5.3)
POTASSIUM SERPL-SCNC: 3.8 MMOL/L — SIGNIFICANT CHANGE UP (ref 3.5–5.3)
PROT SERPL-MCNC: 7.3 GM/DL — SIGNIFICANT CHANGE UP (ref 6–8.3)
RBC # BLD: 4.18 M/UL — SIGNIFICANT CHANGE UP (ref 3.8–5.2)
RBC # FLD: 13 % — SIGNIFICANT CHANGE UP (ref 10.3–14.5)
SODIUM SERPL-SCNC: 138 MMOL/L — SIGNIFICANT CHANGE UP (ref 135–145)
TROPONIN I SERPL-MCNC: <0.015 NG/ML — SIGNIFICANT CHANGE UP (ref 0.01–0.04)
TROPONIN I SERPL-MCNC: <0.015 NG/ML — SIGNIFICANT CHANGE UP (ref 0.01–0.04)
WBC # BLD: 8.59 K/UL — SIGNIFICANT CHANGE UP (ref 3.8–10.5)
WBC # FLD AUTO: 8.59 K/UL — SIGNIFICANT CHANGE UP (ref 3.8–10.5)

## 2019-03-11 PROCEDURE — 93010 ELECTROCARDIOGRAM REPORT: CPT

## 2019-03-11 PROCEDURE — 71046 X-RAY EXAM CHEST 2 VIEWS: CPT | Mod: 26

## 2019-03-11 PROCEDURE — 99285 EMERGENCY DEPT VISIT HI MDM: CPT

## 2019-03-11 RX ORDER — ASPIRIN/CALCIUM CARB/MAGNESIUM 324 MG
81 TABLET ORAL ONCE
Qty: 0 | Refills: 0 | Status: COMPLETED | OUTPATIENT
Start: 2019-03-11 | End: 2019-03-11

## 2019-03-11 RX ADMIN — Medication 81 MILLIGRAM(S): at 15:34

## 2019-03-11 NOTE — ED ADULT NURSE NOTE - CHPI ED NUR SYMPTOMS NEG
no syncope/no dizziness/no back pain/no congestion/no diaphoresis/no chills/no fever/no nausea/no shortness of breath/no vomiting

## 2019-03-11 NOTE — ED ADULT NURSE NOTE - NSIMPLEMENTINTERV_GEN_ALL_ED
Implemented All Universal Safety Interventions:  [place] to call system. Call bell, personal items and telephone within reach. Instruct patient to call for assistance. Room bathroom lighting operational. Non-slip footwear when patient is off stretcher. Physically safe environment: no spills, clutter or unnecessary equipment. Stretcher in lowest position, wheels locked, appropriate side rails in place.

## 2019-03-11 NOTE — ED STATDOCS - CLINICAL SUMMARY MEDICAL DECISION MAKING FREE TEXT BOX
Plan: labs, troponin, EKG. Plan: labs, troponin, EKG. Labs and imaging unremarkable. HEART score: 3. Plan for d/c

## 2019-03-11 NOTE — ED STATDOCS - OBJECTIVE STATEMENT
69 y/o female with a PMHx of depression, diverticulitis, GERD, hematuria, HTN, spondylosis, herniated discs, fibromyalgia, AV block presents to the ED c/o CP. Pt notes pain radiated to arm 3 days ago. +CP, +chest tightness. Pt notes she had PNA in November. Pt was on Zpak. Not on blood thinners. Pt sent by cardiologist for troponin levels. Smoker (2 to 3 cigarettes today). Occasional EtOH use. Cardiologist: Dr. Duke. No other complaints at this time.

## 2019-03-11 NOTE — ED STATDOCS - PROGRESS NOTE DETAILS
67 y/o F with PMH of depression, GERD, HTN presents with CP x 1 week, worse over last 3 days. States pain located on left chest, radiating to left neck and shoulder. Denies associated palpitations, SOB. No change with exertion. Had +stress with negative cardiac cath 1 year ago. Seen by Dr. Duke this AM, advised to come to ED to r/o ACS. PE: NAD, eating in bed. Cardiac: s1s2, RRR. Lungs: CTAB. Abdomen: NBS x4, soft, nontender. PV: No LE edema or calf tenderness. A/P: R/o ACS. CXR, EKG, labs, cardiac monitor. Reassess. - Roge Lino PA-C Labs unremarkable including troponin x2. Plan for d/c at this time. - Roge Lino PA-C

## 2019-03-11 NOTE — ED ADULT TRIAGE NOTE - CHIEF COMPLAINT QUOTE
c/o chest pain started 2 days ago, chest tightness radiating to right shoulder, c/o lightheadedness, sent in by cardiologist for troponin levels, O2 sat in triage 94% Pulse 72

## 2019-06-19 ENCOUNTER — APPOINTMENT (OUTPATIENT)
Dept: ULTRASOUND IMAGING | Facility: CLINIC | Age: 69
End: 2019-06-19
Payer: MEDICARE

## 2019-06-19 ENCOUNTER — APPOINTMENT (OUTPATIENT)
Dept: MAMMOGRAPHY | Facility: CLINIC | Age: 69
End: 2019-06-19
Payer: MEDICARE

## 2019-06-19 ENCOUNTER — OUTPATIENT (OUTPATIENT)
Dept: OUTPATIENT SERVICES | Facility: HOSPITAL | Age: 69
LOS: 1 days | End: 2019-06-19
Payer: MEDICARE

## 2019-06-19 ENCOUNTER — APPOINTMENT (OUTPATIENT)
Dept: CT IMAGING | Facility: CLINIC | Age: 69
End: 2019-06-19

## 2019-06-19 DIAGNOSIS — Z00.8 ENCOUNTER FOR OTHER GENERAL EXAMINATION: ICD-10-CM

## 2019-06-19 DIAGNOSIS — Z98.890 OTHER SPECIFIED POSTPROCEDURAL STATES: Chronic | ICD-10-CM

## 2019-06-19 DIAGNOSIS — Z96.7 PRESENCE OF OTHER BONE AND TENDON IMPLANTS: Chronic | ICD-10-CM

## 2019-06-19 PROCEDURE — 71250 CT THORAX DX C-: CPT | Mod: 26

## 2019-06-19 PROCEDURE — 77067 SCR MAMMO BI INCL CAD: CPT

## 2019-06-19 PROCEDURE — 76641 ULTRASOUND BREAST COMPLETE: CPT

## 2019-06-19 PROCEDURE — 71250 CT THORAX DX C-: CPT

## 2019-06-19 PROCEDURE — 77063 BREAST TOMOSYNTHESIS BI: CPT

## 2019-06-19 PROCEDURE — 77067 SCR MAMMO BI INCL CAD: CPT | Mod: 26

## 2019-06-19 PROCEDURE — 76641 ULTRASOUND BREAST COMPLETE: CPT | Mod: 26,50

## 2019-06-19 PROCEDURE — 77063 BREAST TOMOSYNTHESIS BI: CPT | Mod: 26

## 2019-09-19 ENCOUNTER — TRANSCRIPTION ENCOUNTER (OUTPATIENT)
Age: 69
End: 2019-09-19

## 2019-09-20 ENCOUNTER — OUTPATIENT (OUTPATIENT)
Dept: OUTPATIENT SERVICES | Facility: HOSPITAL | Age: 69
LOS: 1 days | End: 2019-09-20
Payer: MEDICARE

## 2019-09-20 DIAGNOSIS — Z98.890 OTHER SPECIFIED POSTPROCEDURAL STATES: Chronic | ICD-10-CM

## 2019-09-20 DIAGNOSIS — M47.817 SPONDYLOSIS WITHOUT MYELOPATHY OR RADICULOPATHY, LUMBOSACRAL REGION: ICD-10-CM

## 2019-09-20 DIAGNOSIS — Z96.7 PRESENCE OF OTHER BONE AND TENDON IMPLANTS: Chronic | ICD-10-CM

## 2019-09-20 PROCEDURE — 64494 INJ PARAVERT F JNT L/S 2 LEV: CPT | Mod: 50

## 2019-09-20 PROCEDURE — 64493 INJ PARAVERT F JNT L/S 1 LEV: CPT | Mod: 50

## 2019-09-20 PROCEDURE — 76000 FLUOROSCOPY <1 HR PHYS/QHP: CPT

## 2019-10-03 ENCOUNTER — TRANSCRIPTION ENCOUNTER (OUTPATIENT)
Age: 69
End: 2019-10-03

## 2019-10-04 ENCOUNTER — OUTPATIENT (OUTPATIENT)
Dept: OUTPATIENT SERVICES | Facility: HOSPITAL | Age: 69
LOS: 1 days | End: 2019-10-04
Payer: MEDICARE

## 2019-10-04 DIAGNOSIS — M46.1 SACROILIITIS, NOT ELSEWHERE CLASSIFIED: ICD-10-CM

## 2019-10-04 DIAGNOSIS — Z98.890 OTHER SPECIFIED POSTPROCEDURAL STATES: Chronic | ICD-10-CM

## 2019-10-04 DIAGNOSIS — Z96.7 PRESENCE OF OTHER BONE AND TENDON IMPLANTS: Chronic | ICD-10-CM

## 2019-10-04 PROCEDURE — 76000 FLUOROSCOPY <1 HR PHYS/QHP: CPT

## 2019-10-04 PROCEDURE — G0260: CPT | Mod: 50

## 2019-11-13 ENCOUNTER — TRANSCRIPTION ENCOUNTER (OUTPATIENT)
Age: 69
End: 2019-11-13

## 2019-11-14 ENCOUNTER — OUTPATIENT (OUTPATIENT)
Dept: OUTPATIENT SERVICES | Facility: HOSPITAL | Age: 69
LOS: 1 days | End: 2019-11-14
Payer: MEDICARE

## 2019-11-14 DIAGNOSIS — Z98.890 OTHER SPECIFIED POSTPROCEDURAL STATES: Chronic | ICD-10-CM

## 2019-11-14 DIAGNOSIS — Z96.7 PRESENCE OF OTHER BONE AND TENDON IMPLANTS: Chronic | ICD-10-CM

## 2019-11-14 DIAGNOSIS — M46.1 SACROILIITIS, NOT ELSEWHERE CLASSIFIED: ICD-10-CM

## 2019-11-14 PROCEDURE — 76000 FLUOROSCOPY <1 HR PHYS/QHP: CPT

## 2019-11-14 PROCEDURE — G0260: CPT | Mod: 50

## 2019-12-17 NOTE — ASU DISCHARGE PLAN (ADULT/PEDIATRIC). - ACCOMPANYING ADULT'S SIGNATURE_______________________________________
RE:   was seen Sat for pink eye sxs -  has given drops 4x daily - now in other eye same sxs       please advise Statement Selected

## 2020-01-15 ENCOUNTER — TRANSCRIPTION ENCOUNTER (OUTPATIENT)
Age: 70
End: 2020-01-15

## 2020-01-16 ENCOUNTER — OUTPATIENT (OUTPATIENT)
Dept: OUTPATIENT SERVICES | Facility: HOSPITAL | Age: 70
LOS: 1 days | End: 2020-01-16
Payer: MEDICARE

## 2020-01-16 DIAGNOSIS — Z98.890 OTHER SPECIFIED POSTPROCEDURAL STATES: Chronic | ICD-10-CM

## 2020-01-16 DIAGNOSIS — M46.1 SACROILIITIS, NOT ELSEWHERE CLASSIFIED: ICD-10-CM

## 2020-01-16 DIAGNOSIS — Z96.7 PRESENCE OF OTHER BONE AND TENDON IMPLANTS: Chronic | ICD-10-CM

## 2020-01-16 PROCEDURE — 76000 FLUOROSCOPY <1 HR PHYS/QHP: CPT

## 2020-01-16 PROCEDURE — G0260: CPT | Mod: 50

## 2020-05-11 NOTE — ASU PREOP CHECKLIST - BETA DATE TIME
pt comes to ED with a fall from a bed, approx 3 feet with no loc, cried right away. child is awake and acting normally per mother, fed in the car with no vomiting
12-Feb-2018 06:00

## 2020-11-05 ENCOUNTER — APPOINTMENT (OUTPATIENT)
Dept: ULTRASOUND IMAGING | Facility: CLINIC | Age: 70
End: 2020-11-05
Payer: MEDICARE

## 2020-11-05 ENCOUNTER — APPOINTMENT (OUTPATIENT)
Dept: MAMMOGRAPHY | Facility: CLINIC | Age: 70
End: 2020-11-05
Payer: MEDICARE

## 2020-11-05 ENCOUNTER — OUTPATIENT (OUTPATIENT)
Dept: OUTPATIENT SERVICES | Facility: HOSPITAL | Age: 70
LOS: 1 days | End: 2020-11-05
Payer: MEDICARE

## 2020-11-05 DIAGNOSIS — Z98.890 OTHER SPECIFIED POSTPROCEDURAL STATES: Chronic | ICD-10-CM

## 2020-11-05 DIAGNOSIS — Z96.7 PRESENCE OF OTHER BONE AND TENDON IMPLANTS: Chronic | ICD-10-CM

## 2020-11-05 DIAGNOSIS — Z00.8 ENCOUNTER FOR OTHER GENERAL EXAMINATION: ICD-10-CM

## 2020-11-05 PROCEDURE — 77067 SCR MAMMO BI INCL CAD: CPT | Mod: 26

## 2020-11-05 PROCEDURE — 77063 BREAST TOMOSYNTHESIS BI: CPT

## 2020-11-05 PROCEDURE — 76641 ULTRASOUND BREAST COMPLETE: CPT | Mod: 26,50

## 2020-11-05 PROCEDURE — 77067 SCR MAMMO BI INCL CAD: CPT

## 2020-11-05 PROCEDURE — 77063 BREAST TOMOSYNTHESIS BI: CPT | Mod: 26

## 2020-11-05 PROCEDURE — 76641 ULTRASOUND BREAST COMPLETE: CPT

## 2020-11-05 NOTE — H&P PST ADULT - NS PRO AD NO ADVANCE DIRECTIVE
Problem List Items Addressed This Visit        Unprioritized    Hypertension - Primary     Blood pressure 126/77, pulse 60, temperature 98.5 °F (36.9 °C), temperature source Tympanic, resp.  rate 16, height 5' 7\" (1.702 m), weight 171 lb (77.6 kg), not cur No (2) good, crying

## 2020-12-03 ENCOUNTER — APPOINTMENT (OUTPATIENT)
Dept: MRI IMAGING | Facility: CLINIC | Age: 70
End: 2020-12-03

## 2021-03-19 NOTE — ED ADULT NURSE NOTE - CADM POA CENTRAL LINE
Render In Strict Bullet Format?: No Detail Level: Zone Initiate Treatment: Clobetasol twice daily for 2 weeks No

## 2021-05-20 ENCOUNTER — APPOINTMENT (OUTPATIENT)
Dept: CT IMAGING | Facility: CLINIC | Age: 71
End: 2021-05-20
Payer: MEDICARE

## 2021-05-20 ENCOUNTER — OUTPATIENT (OUTPATIENT)
Dept: OUTPATIENT SERVICES | Facility: HOSPITAL | Age: 71
LOS: 1 days | End: 2021-05-20
Payer: MEDICARE

## 2021-05-20 DIAGNOSIS — Z00.8 ENCOUNTER FOR OTHER GENERAL EXAMINATION: ICD-10-CM

## 2021-05-20 DIAGNOSIS — Z98.890 OTHER SPECIFIED POSTPROCEDURAL STATES: Chronic | ICD-10-CM

## 2021-05-20 DIAGNOSIS — Z96.7 PRESENCE OF OTHER BONE AND TENDON IMPLANTS: Chronic | ICD-10-CM

## 2021-05-20 PROCEDURE — 70450 CT HEAD/BRAIN W/O DYE: CPT

## 2021-05-20 PROCEDURE — 70450 CT HEAD/BRAIN W/O DYE: CPT | Mod: 26,MH

## 2021-08-06 ENCOUNTER — APPOINTMENT (OUTPATIENT)
Dept: RADIOLOGY | Facility: CLINIC | Age: 71
End: 2021-08-06
Payer: MEDICARE

## 2021-08-06 ENCOUNTER — OUTPATIENT (OUTPATIENT)
Dept: OUTPATIENT SERVICES | Facility: HOSPITAL | Age: 71
LOS: 1 days | End: 2021-08-06
Payer: MEDICARE

## 2021-08-06 DIAGNOSIS — Z96.7 PRESENCE OF OTHER BONE AND TENDON IMPLANTS: Chronic | ICD-10-CM

## 2021-08-06 DIAGNOSIS — Z98.890 OTHER SPECIFIED POSTPROCEDURAL STATES: Chronic | ICD-10-CM

## 2021-08-06 DIAGNOSIS — Z00.8 ENCOUNTER FOR OTHER GENERAL EXAMINATION: ICD-10-CM

## 2021-08-06 PROCEDURE — 71046 X-RAY EXAM CHEST 2 VIEWS: CPT

## 2021-08-06 PROCEDURE — 71046 X-RAY EXAM CHEST 2 VIEWS: CPT | Mod: 26

## 2022-01-04 ENCOUNTER — OUTPATIENT (OUTPATIENT)
Dept: OUTPATIENT SERVICES | Facility: HOSPITAL | Age: 72
LOS: 1 days | End: 2022-01-04
Payer: MEDICARE

## 2022-01-04 DIAGNOSIS — Z96.7 PRESENCE OF OTHER BONE AND TENDON IMPLANTS: Chronic | ICD-10-CM

## 2022-01-04 DIAGNOSIS — Z98.890 OTHER SPECIFIED POSTPROCEDURAL STATES: Chronic | ICD-10-CM

## 2022-01-04 DIAGNOSIS — Z20.828 CONTACT WITH AND (SUSPECTED) EXPOSURE TO OTHER VIRAL COMMUNICABLE DISEASES: ICD-10-CM

## 2022-01-04 LAB — SARS-COV-2 RNA SPEC QL NAA+PROBE: DETECTED

## 2022-01-04 PROCEDURE — C9803: CPT

## 2022-01-04 PROCEDURE — U0003: CPT

## 2022-01-04 PROCEDURE — U0005: CPT

## 2022-01-05 DIAGNOSIS — Z20.828 CONTACT WITH AND (SUSPECTED) EXPOSURE TO OTHER VIRAL COMMUNICABLE DISEASES: ICD-10-CM

## 2022-07-25 ENCOUNTER — APPOINTMENT (OUTPATIENT)
Dept: ULTRASOUND IMAGING | Facility: CLINIC | Age: 72
End: 2022-07-25

## 2022-07-25 ENCOUNTER — APPOINTMENT (OUTPATIENT)
Dept: MAMMOGRAPHY | Facility: CLINIC | Age: 72
End: 2022-07-25

## 2022-07-25 ENCOUNTER — OUTPATIENT (OUTPATIENT)
Dept: OUTPATIENT SERVICES | Facility: HOSPITAL | Age: 72
LOS: 1 days | End: 2022-07-25
Payer: MEDICARE

## 2022-07-25 DIAGNOSIS — Z98.890 OTHER SPECIFIED POSTPROCEDURAL STATES: Chronic | ICD-10-CM

## 2022-07-25 DIAGNOSIS — Z96.7 PRESENCE OF OTHER BONE AND TENDON IMPLANTS: Chronic | ICD-10-CM

## 2022-07-25 DIAGNOSIS — Z00.8 ENCOUNTER FOR OTHER GENERAL EXAMINATION: ICD-10-CM

## 2022-07-25 PROCEDURE — 76641 ULTRASOUND BREAST COMPLETE: CPT

## 2022-07-25 PROCEDURE — 76700 US EXAM ABDOM COMPLETE: CPT | Mod: 26

## 2022-07-25 PROCEDURE — 77063 BREAST TOMOSYNTHESIS BI: CPT

## 2022-07-25 PROCEDURE — 77063 BREAST TOMOSYNTHESIS BI: CPT | Mod: 26

## 2022-07-25 PROCEDURE — 76700 US EXAM ABDOM COMPLETE: CPT

## 2022-07-25 PROCEDURE — 76641 ULTRASOUND BREAST COMPLETE: CPT | Mod: 26,50

## 2022-07-25 PROCEDURE — 77067 SCR MAMMO BI INCL CAD: CPT | Mod: 26

## 2022-07-25 PROCEDURE — 77067 SCR MAMMO BI INCL CAD: CPT

## 2022-10-26 NOTE — ED STATDOCS - NS_EDPROVIDERDISPOUSERTYPE_ED_A_ED
Scribe Attestation (For Scribes USE Only)... Yes Scribe Attestation (For Scribes USE Only).../Attending Attestation (For Attendings USE Only)...

## 2022-11-03 ENCOUNTER — EMERGENCY (EMERGENCY)
Facility: HOSPITAL | Age: 72
LOS: 0 days | Discharge: HOME | End: 2022-11-03
Attending: EMERGENCY MEDICINE
Payer: COMMERCIAL

## 2022-11-03 VITALS — HEIGHT: 64 IN | WEIGHT: 173.94 LBS

## 2022-11-03 VITALS
OXYGEN SATURATION: 97 % | HEART RATE: 67 BPM | SYSTOLIC BLOOD PRESSURE: 159 MMHG | DIASTOLIC BLOOD PRESSURE: 66 MMHG | TEMPERATURE: 98 F | RESPIRATION RATE: 17 BRPM

## 2022-11-03 DIAGNOSIS — K21.9 GASTRO-ESOPHAGEAL REFLUX DISEASE WITHOUT ESOPHAGITIS: ICD-10-CM

## 2022-11-03 DIAGNOSIS — Z87.39 PERSONAL HISTORY OF OTHER DISEASES OF THE MUSCULOSKELETAL SYSTEM AND CONNECTIVE TISSUE: ICD-10-CM

## 2022-11-03 DIAGNOSIS — M85.80 OTHER SPECIFIED DISORDERS OF BONE DENSITY AND STRUCTURE, UNSPECIFIED SITE: ICD-10-CM

## 2022-11-03 DIAGNOSIS — Z98.890 OTHER SPECIFIED POSTPROCEDURAL STATES: Chronic | ICD-10-CM

## 2022-11-03 DIAGNOSIS — Z87.19 PERSONAL HISTORY OF OTHER DISEASES OF THE DIGESTIVE SYSTEM: ICD-10-CM
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
## 2022-11-03 DIAGNOSIS — R51.9 HEADACHE, UNSPECIFIED: ICD-10-CM
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
## 2022-11-03 DIAGNOSIS — Z96.7 PRESENCE OF OTHER BONE AND TENDON IMPLANTS: Chronic | ICD-10-CM

## 2022-11-03 DIAGNOSIS — Z90.710 ACQUIRED ABSENCE OF BOTH CERVIX AND UTERUS: ICD-10-CM

## 2022-11-03 DIAGNOSIS — I10 ESSENTIAL (PRIMARY) HYPERTENSION: ICD-10-CM

## 2022-11-03 DIAGNOSIS — Z86.19 PERSONAL HISTORY OF OTHER INFECTIOUS AND PARASITIC DISEASES: ICD-10-CM

## 2022-11-03 DIAGNOSIS — F32.A DEPRESSION, UNSPECIFIED: ICD-10-CM

## 2022-11-03 DIAGNOSIS — Z88.9 ALLERGY STATUS TO UNSPECIFIED DRUGS, MEDICAMENTS AND BIOLOGICAL SUBSTANCES: ICD-10-CM

## 2022-11-03 DIAGNOSIS — K58.9 IRRITABLE BOWEL SYNDROME WITHOUT DIARRHEA: ICD-10-CM

## 2022-11-03 DIAGNOSIS — Z86.2 PERSONAL HISTORY OF DISEASES OF THE BLOOD AND BLOOD-FORMING ORGANS AND CERTAIN DISORDERS INVOLVING THE IMMUNE MECHANISM: ICD-10-CM

## 2022-11-03 DIAGNOSIS — Z87.42 PERSONAL HISTORY OF OTHER DISEASES OF THE FEMALE GENITAL TRACT: ICD-10-CM

## 2022-11-03 DIAGNOSIS — M54.6 PAIN IN THORACIC SPINE: ICD-10-CM

## 2022-11-03 DIAGNOSIS — M54.2 CERVICALGIA: ICD-10-CM

## 2022-11-03 DIAGNOSIS — Z90.49 ACQUIRED ABSENCE OF OTHER SPECIFIED PARTS OF DIGESTIVE TRACT: ICD-10-CM

## 2022-11-03 DIAGNOSIS — Z98.890 OTHER SPECIFIED POSTPROCEDURAL STATES: ICD-10-CM

## 2022-11-03 DIAGNOSIS — S16.1XXA STRAIN OF MUSCLE, FASCIA AND TENDON AT NECK LEVEL, INITIAL ENCOUNTER: ICD-10-CM

## 2022-11-03 DIAGNOSIS — M79.7 FIBROMYALGIA: ICD-10-CM

## 2022-11-03 PROCEDURE — 99284 EMERGENCY DEPT VISIT MOD MDM: CPT

## 2022-11-03 PROCEDURE — 99283 EMERGENCY DEPT VISIT LOW MDM: CPT

## 2022-11-03 RX ORDER — OXYCODONE HYDROCHLORIDE 5 MG/1
5 TABLET ORAL ONCE
Refills: 0 | Status: DISCONTINUED | OUTPATIENT
Start: 2022-11-03 | End: 2022-11-03

## 2022-11-03 RX ORDER — LIDOCAINE 4 G/100G
1 CREAM TOPICAL ONCE
Refills: 0 | Status: COMPLETED | OUTPATIENT
Start: 2022-11-03 | End: 2022-11-03

## 2022-11-03 RX ORDER — IBUPROFEN 200 MG
400 TABLET ORAL ONCE
Refills: 0 | Status: COMPLETED | OUTPATIENT
Start: 2022-11-03 | End: 2022-11-03

## 2022-11-03 RX ORDER — IBUPROFEN 200 MG
400 TABLET ORAL ONCE
Refills: 0 | Status: DISCONTINUED | OUTPATIENT
Start: 2022-11-03 | End: 2022-11-03

## 2022-11-03 RX ORDER — ACETAMINOPHEN 500 MG
650 TABLET ORAL ONCE
Refills: 0 | Status: COMPLETED | OUTPATIENT
Start: 2022-11-03 | End: 2022-11-03

## 2022-11-03 RX ADMIN — OXYCODONE HYDROCHLORIDE 5 MILLIGRAM(S): 5 TABLET ORAL at 19:50

## 2022-11-03 RX ADMIN — LIDOCAINE 1 PATCH: 4 CREAM TOPICAL at 19:50

## 2022-11-03 RX ADMIN — Medication 650 MILLIGRAM(S): at 19:50

## 2022-11-03 RX ADMIN — Medication 400 MILLIGRAM(S): at 19:50

## 2022-11-03 NOTE — ED STATDOCS - NSFOLLOWUPINSTRUCTIONS_ED_ALL_ED_FT
You were seen in the Emergency Department for back and neck pain. As discussed, please follow up with your spine and pain doctors.     1) Continue all previously prescribed medications as directed.    2) Follow up with your primary care physician - take copies of your results.    3) Return to the Emergency Department for worsening or persistent symptoms, and/or ANY NEW OR CONCERNING SYMPTOMS.

## 2022-11-03 NOTE — ED STATDOCS - NSICDXPASTSURGICALHX_GEN_ALL_CORE_FT
PAST SURGICAL HISTORY:  H/O abdominal hysterectomy     H/O bilateral oophorectomy     H/O microdiscectomy x 2 , 2004    H/O ventral hernia repair     History of bowel resection with ileostomy    History of cholecystectomy     S/P ORIF (open reduction internal fixation) fracture right wrist

## 2022-11-03 NOTE — ED STATDOCS - NSICDXFAMILYHX_GEN_ALL_CORE_FT
FAMILY HISTORY:  Father  Still living? No  Family history of cancer, Age at diagnosis: Age Unknown    Mother  Still living? No  Family history of Alzheimer's disease, Age at diagnosis: Age Unknown  Family history of melanoma, Age at diagnosis: Age Unknown

## 2022-11-03 NOTE — ED STATDOCS - PATIENT PORTAL LINK FT
You can access the FollowMyHealth Patient Portal offered by [facility] by registering at the following website: [email or web address]. By joining [facility]’s FollowMyHealth portal, you will also be able to view your health information using other applications (apps) compatible with our system.

## 2022-11-03 NOTE — ED STATDOCS - NSICDXPASTMEDICALHX_GEN_ALL_CORE_FT
PAST MEDICAL HISTORY:  Bilateral recurrent inguinal hernia without obstruction or gangrene     Cysts of both ovaries     Depression     Diverticulitis     Gall bladder disease     Gastroesophageal reflux disease without esophagitis     Headache     Hematuria     Hemorrhoids     Herniated disc, cervical and lumbar    HTN (hypertension)     Irritable bowel syndrome with diarrhea     Osteopenia     Pain hand and wrist    Recurrent ventral hernia     Spondylosis     Spondylosis     Tear of right rotator cuff, unspecified tear extent     Uterine leiomyoma, unspecified location History of    Yeast infection
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
## 2022-11-03 NOTE — ED STATDOCS - MUSCULOSKELETAL, MLM
TTP bilateral cervical, thoracic paraspinal TTP, no midline TTP, no step off or deformity, able to ambulate well.
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
## 2022-11-03 NOTE — ED STATDOCS - ENMT, MLM
Nasal mucosa clear.  Mouth with normal mucosa  Throat has no vesicles, no oropharyngeal exudates and uvula is midline., no signs of head trauma

## 2023-01-25 ENCOUNTER — TRANSCRIPTION ENCOUNTER (OUTPATIENT)
Age: 73
End: 2023-01-25

## 2023-01-26 ENCOUNTER — OUTPATIENT (OUTPATIENT)
Dept: OUTPATIENT SERVICES | Facility: HOSPITAL | Age: 73
LOS: 1 days | End: 2023-01-26
Payer: COMMERCIAL

## 2023-01-26 DIAGNOSIS — Z98.890 OTHER SPECIFIED POSTPROCEDURAL STATES: Chronic | ICD-10-CM

## 2023-01-26 DIAGNOSIS — M47.812 SPONDYLOSIS WITHOUT MYELOPATHY OR RADICULOPATHY, CERVICAL REGION: ICD-10-CM

## 2023-01-26 DIAGNOSIS — Z96.7 PRESENCE OF OTHER BONE AND TENDON IMPLANTS: Chronic | ICD-10-CM

## 2023-01-26 PROCEDURE — 76000 FLUOROSCOPY <1 HR PHYS/QHP: CPT

## 2023-01-26 PROCEDURE — 64491 INJ PARAVERT F JNT C/T 2 LEV: CPT

## 2023-01-26 PROCEDURE — 64490 INJ PARAVERT F JNT C/T 1 LEV: CPT | Mod: 50

## 2023-04-03 ENCOUNTER — NON-APPOINTMENT (OUTPATIENT)
Age: 73
End: 2023-04-03

## 2023-04-03 ENCOUNTER — OUTPATIENT (OUTPATIENT)
Dept: OUTPATIENT SERVICES | Facility: HOSPITAL | Age: 73
LOS: 1 days | End: 2023-04-03
Payer: MEDICARE

## 2023-04-03 ENCOUNTER — APPOINTMENT (OUTPATIENT)
Dept: CT IMAGING | Facility: CLINIC | Age: 73
End: 2023-04-03
Payer: MEDICARE

## 2023-04-03 VITALS — HEIGHT: 64 IN | BODY MASS INDEX: 29.53 KG/M2 | WEIGHT: 173 LBS

## 2023-04-03 DIAGNOSIS — Z00.8 ENCOUNTER FOR OTHER GENERAL EXAMINATION: ICD-10-CM

## 2023-04-03 DIAGNOSIS — Z98.890 OTHER SPECIFIED POSTPROCEDURAL STATES: Chronic | ICD-10-CM

## 2023-04-03 DIAGNOSIS — F17.210 NICOTINE DEPENDENCE, CIGARETTES, UNCOMPLICATED: ICD-10-CM

## 2023-04-03 DIAGNOSIS — Z96.7 PRESENCE OF OTHER BONE AND TENDON IMPLANTS: Chronic | ICD-10-CM

## 2023-04-03 DIAGNOSIS — F17.200 NICOTINE DEPENDENCE, UNSPECIFIED, UNCOMPLICATED: ICD-10-CM

## 2023-04-03 PROCEDURE — 71271 CT THORAX LUNG CANCER SCR C-: CPT

## 2023-04-03 PROCEDURE — 71271 CT THORAX LUNG CANCER SCR C-: CPT | Mod: 26

## 2023-04-03 NOTE — HISTORY OF PRESENT ILLNESS
[Current] : Current [TextBox_13] : Referred by Dr. Dominik Frazier.\par \par Ms. MCKEON is a 72 year old female with a history of HTN.\par \par She was called to review eligibility for Low-Dose CT lung cancer screening.  Reviewed and confirmed that the patient meets screening eligibility criteria:\par \par 72 years old \par \par Smoking Status:  Current Smoker\par \par Number of pack(s) per day: 0.5\par Number of years smoked: 59\par Number of pack years smokin\par \par No symptoms of lung cancer, including new cough, change in cough, hemoptysis, and unintentional weight loss.\par \par No personal history of lung cancer.  No lung cancer in a first degree relative.  No history of lung disease or occupational exposures. [PacksperDay] : 0.5 [N_Years] : 59 [PacksperYear] : 30

## 2023-06-16 ENCOUNTER — INPATIENT (INPATIENT)
Facility: HOSPITAL | Age: 73
LOS: 2 days | Discharge: HOME | DRG: 287 | End: 2023-06-19
Attending: HOSPITALIST | Admitting: FAMILY MEDICINE
Payer: MEDICARE

## 2023-06-16 VITALS — HEIGHT: 64 IN | WEIGHT: 173.06 LBS

## 2023-06-16 DIAGNOSIS — Z98.890 OTHER SPECIFIED POSTPROCEDURAL STATES: Chronic | ICD-10-CM

## 2023-06-16 DIAGNOSIS — Z96.7 PRESENCE OF OTHER BONE AND TENDON IMPLANTS: Chronic | ICD-10-CM

## 2023-06-16 DIAGNOSIS — R07.9 CHEST PAIN, UNSPECIFIED: ICD-10-CM

## 2023-06-16 LAB
ALBUMIN SERPL ELPH-MCNC: 3.1 G/DL — LOW (ref 3.3–5)
ALP SERPL-CCNC: 57 U/L — SIGNIFICANT CHANGE UP (ref 40–120)
ALT FLD-CCNC: 18 U/L — SIGNIFICANT CHANGE UP (ref 12–78)
ANION GAP SERPL CALC-SCNC: 4 MMOL/L — LOW (ref 5–17)
APPEARANCE UR: CLEAR — SIGNIFICANT CHANGE UP
AST SERPL-CCNC: 8 U/L — LOW (ref 15–37)
BACTERIA # UR AUTO: ABNORMAL
BASOPHILS # BLD AUTO: 0.04 K/UL — SIGNIFICANT CHANGE UP (ref 0–0.2)
BASOPHILS NFR BLD AUTO: 0.4 % — SIGNIFICANT CHANGE UP (ref 0–2)
BILIRUB SERPL-MCNC: 0.3 MG/DL — SIGNIFICANT CHANGE UP (ref 0.2–1.2)
BILIRUB UR-MCNC: NEGATIVE — SIGNIFICANT CHANGE UP
BUN SERPL-MCNC: 11 MG/DL — SIGNIFICANT CHANGE UP (ref 7–23)
CALCIUM SERPL-MCNC: 9.5 MG/DL — SIGNIFICANT CHANGE UP (ref 8.5–10.1)
CHLORIDE SERPL-SCNC: 106 MMOL/L — SIGNIFICANT CHANGE UP (ref 96–108)
CO2 SERPL-SCNC: 30 MMOL/L — SIGNIFICANT CHANGE UP (ref 22–31)
COLOR SPEC: YELLOW — SIGNIFICANT CHANGE UP
CREAT SERPL-MCNC: 0.65 MG/DL — SIGNIFICANT CHANGE UP (ref 0.5–1.3)
DIFF PNL FLD: ABNORMAL
EGFR: 94 ML/MIN/1.73M2 — SIGNIFICANT CHANGE UP
EOSINOPHIL # BLD AUTO: 0.1 K/UL — SIGNIFICANT CHANGE UP (ref 0–0.5)
EOSINOPHIL NFR BLD AUTO: 1.1 % — SIGNIFICANT CHANGE UP (ref 0–6)
EPI CELLS # UR: SIGNIFICANT CHANGE UP
GLUCOSE SERPL-MCNC: 114 MG/DL — HIGH (ref 70–99)
GLUCOSE UR QL: NEGATIVE — SIGNIFICANT CHANGE UP
HCT VFR BLD CALC: 36.4 % — SIGNIFICANT CHANGE UP (ref 34.5–45)
HGB BLD-MCNC: 12.1 G/DL — SIGNIFICANT CHANGE UP (ref 11.5–15.5)
IMM GRANULOCYTES NFR BLD AUTO: 0.2 % — SIGNIFICANT CHANGE UP (ref 0–0.9)
KETONES UR-MCNC: NEGATIVE — SIGNIFICANT CHANGE UP
LEUKOCYTE ESTERASE UR-ACNC: NEGATIVE — SIGNIFICANT CHANGE UP
LIDOCAIN IGE QN: 200 U/L — SIGNIFICANT CHANGE UP (ref 73–393)
LYMPHOCYTES # BLD AUTO: 3.13 K/UL — SIGNIFICANT CHANGE UP (ref 1–3.3)
LYMPHOCYTES # BLD AUTO: 34.5 % — SIGNIFICANT CHANGE UP (ref 13–44)
MAGNESIUM SERPL-MCNC: 1.4 MG/DL — LOW (ref 1.6–2.6)
MCHC RBC-ENTMCNC: 29.2 PG — SIGNIFICANT CHANGE UP (ref 27–34)
MCHC RBC-ENTMCNC: 33.2 GM/DL — SIGNIFICANT CHANGE UP (ref 32–36)
MCV RBC AUTO: 87.9 FL — SIGNIFICANT CHANGE UP (ref 80–100)
MONOCYTES # BLD AUTO: 0.52 K/UL — SIGNIFICANT CHANGE UP (ref 0–0.9)
MONOCYTES NFR BLD AUTO: 5.7 % — SIGNIFICANT CHANGE UP (ref 2–14)
NEUTROPHILS # BLD AUTO: 5.27 K/UL — SIGNIFICANT CHANGE UP (ref 1.8–7.4)
NEUTROPHILS NFR BLD AUTO: 58.1 % — SIGNIFICANT CHANGE UP (ref 43–77)
NITRITE UR-MCNC: NEGATIVE — SIGNIFICANT CHANGE UP
NT-PROBNP SERPL-SCNC: 46 PG/ML — SIGNIFICANT CHANGE UP (ref 0–125)
PH UR: 6.5 — SIGNIFICANT CHANGE UP (ref 5–8)
PLATELET # BLD AUTO: 234 K/UL — SIGNIFICANT CHANGE UP (ref 150–400)
POTASSIUM SERPL-MCNC: 3.6 MMOL/L — SIGNIFICANT CHANGE UP (ref 3.5–5.3)
POTASSIUM SERPL-SCNC: 3.6 MMOL/L — SIGNIFICANT CHANGE UP (ref 3.5–5.3)
PROT SERPL-MCNC: 6.7 GM/DL — SIGNIFICANT CHANGE UP (ref 6–8.3)
PROT UR-MCNC: NEGATIVE — SIGNIFICANT CHANGE UP
RBC # BLD: 4.14 M/UL — SIGNIFICANT CHANGE UP (ref 3.8–5.2)
RBC # FLD: 12.2 % — SIGNIFICANT CHANGE UP (ref 10.3–14.5)
RBC CASTS # UR COMP ASSIST: ABNORMAL /HPF (ref 0–4)
SODIUM SERPL-SCNC: 140 MMOL/L — SIGNIFICANT CHANGE UP (ref 135–145)
SP GR SPEC: 1.01 — SIGNIFICANT CHANGE UP (ref 1.01–1.02)
TROPONIN I, HIGH SENSITIVITY RESULT: 5.69 NG/L — SIGNIFICANT CHANGE UP
TROPONIN I, HIGH SENSITIVITY RESULT: 6.1 NG/L — SIGNIFICANT CHANGE UP
UROBILINOGEN FLD QL: NEGATIVE — SIGNIFICANT CHANGE UP
WBC # BLD: 9.08 K/UL — SIGNIFICANT CHANGE UP (ref 3.8–10.5)
WBC # FLD AUTO: 9.08 K/UL — SIGNIFICANT CHANGE UP (ref 3.8–10.5)
WBC UR QL: SIGNIFICANT CHANGE UP /HPF (ref 0–5)

## 2023-06-16 PROCEDURE — 99222 1ST HOSP IP/OBS MODERATE 55: CPT

## 2023-06-16 PROCEDURE — 36415 COLL VENOUS BLD VENIPUNCTURE: CPT

## 2023-06-16 PROCEDURE — 93010 ELECTROCARDIOGRAM REPORT: CPT | Mod: 76

## 2023-06-16 PROCEDURE — C1769: CPT

## 2023-06-16 PROCEDURE — 84484 ASSAY OF TROPONIN QUANT: CPT

## 2023-06-16 PROCEDURE — 71045 X-RAY EXAM CHEST 1 VIEW: CPT | Mod: 26

## 2023-06-16 PROCEDURE — C1894: CPT

## 2023-06-16 PROCEDURE — 99285 EMERGENCY DEPT VISIT HI MDM: CPT

## 2023-06-16 PROCEDURE — C1887: CPT

## 2023-06-16 PROCEDURE — 86803 HEPATITIS C AB TEST: CPT

## 2023-06-16 PROCEDURE — 80048 BASIC METABOLIC PNL TOTAL CA: CPT

## 2023-06-16 PROCEDURE — C1760: CPT

## 2023-06-16 PROCEDURE — 80061 LIPID PANEL: CPT

## 2023-06-16 PROCEDURE — 93005 ELECTROCARDIOGRAM TRACING: CPT

## 2023-06-16 PROCEDURE — 93454 CORONARY ARTERY ANGIO S&I: CPT

## 2023-06-16 PROCEDURE — 83036 HEMOGLOBIN GLYCOSYLATED A1C: CPT

## 2023-06-16 RX ORDER — FAMOTIDINE 10 MG/ML
1 INJECTION INTRAVENOUS
Refills: 0 | DISCHARGE

## 2023-06-16 RX ORDER — METOPROLOL TARTRATE 50 MG
25 TABLET ORAL
Qty: 0 | Refills: 0 | DISCHARGE

## 2023-06-16 RX ORDER — OXYCODONE AND ACETAMINOPHEN 5; 325 MG/1; MG/1
1 TABLET ORAL
Refills: 0 | DISCHARGE

## 2023-06-16 RX ORDER — OMEPRAZOLE 10 MG/1
1 CAPSULE, DELAYED RELEASE ORAL
Qty: 0 | Refills: 0 | DISCHARGE

## 2023-06-16 RX ORDER — DIAZEPAM 5 MG
1 TABLET ORAL
Qty: 0 | Refills: 0 | DISCHARGE

## 2023-06-16 RX ORDER — CHOLECALCIFEROL (VITAMIN D3) 125 MCG
1 CAPSULE ORAL
Qty: 0 | Refills: 0 | DISCHARGE

## 2023-06-16 RX ORDER — FLUCONAZOLE 150 MG/1
1 TABLET ORAL
Qty: 0 | Refills: 0 | DISCHARGE

## 2023-06-16 RX ORDER — TRIAMTERENE/HYDROCHLOROTHIAZID 75 MG-50MG
1 TABLET ORAL
Qty: 0 | Refills: 0 | DISCHARGE

## 2023-06-16 RX ORDER — ESTROGENS, CONJUGATED 0.625 MG
1 TABLET ORAL
Qty: 0 | Refills: 0 | DISCHARGE

## 2023-06-16 RX ORDER — LISINOPRIL 2.5 MG/1
1 TABLET ORAL
Refills: 0 | DISCHARGE

## 2023-06-16 NOTE — ED PROVIDER NOTE - EKG #1 DATE/TIME
Pharmacist Note  Warfarin Dosing Consult provided for this 62 y. o.female to manage warfarin for Atrial Fibrillation INR Goal: 2 - 3 Home regimen/ tablet size: 2 mg PO daily Drugs that may increase INR: None Drugs that may decrease INR: None Other current anticoagulants/ drugs that may increase bleeding risk: Sertraline Risk factors: None Daily INR ordered: YES Recent Labs 08/30/20 
1634 08/29/20 
[id number] 08/28/20 
0304 INR 1.1 1.1 1.1 Date               INR                  Dose 8/27  --  3 mg  
8/28                1.1                   3 mg 
8/29                1.1                   3 mg 
8/30                1.1                   3 mg Assessment/ Plan: Will order warfarin 3 mg PO x 1 dose. Expect poor compliance prior to arrival. Will continue to dose above home regimen until INR begins to trend up. Pharmacy will continue to monitor daily and adjust therapy as indicated. Please contact the pharmacist at  for outpatient recommendations if needed. Lucia Whitfield, [address] [facility] 16-Jun-2023 15:13

## 2023-06-16 NOTE — ED PROVIDER NOTE - PROGRESS NOTE DETAILS
pt with intermittent chest pain, no change in ekg, will admit for intermittent cp, no change form ekg on 3/11/23, spoke with Dr. Yañez pt for admission WESLEY Cali DO

## 2023-06-16 NOTE — H&P ADULT - NSHPPHYSICALEXAM_GEN_ALL_CORE
ICU Vital Signs Last 24 Hrs  T(C): 36.4 (16 Jun 2023 22:10), Max: 36.7 (16 Jun 2023 21:51)  T(F): 97.6 (16 Jun 2023 22:10), Max: 98 (16 Jun 2023 21:51)  HR: 68 (16 Jun 2023 22:10) (66 - 72)  BP: 148/70 (16 Jun 2023 22:10) (131/66 - 148/70)  BP(mean): 86 (16 Jun 2023 21:51) (79 - 86)    RR: 18 (16 Jun 2023 22:10) (18 - 20)  SpO2: 99% (16 Jun 2023 22:10) (97% - 99%)    O2 Parameters below as of 16 Jun 2023 22:10  Patient On (Oxygen Delivery Method): room air

## 2023-06-16 NOTE — ED ADULT NURSE NOTE - OBJECTIVE STATEMENT
Pt arrives to ED complaining of chest "tightness" starting yesterday. Pt states tightness intermittently radiates to jaw. Hx HTN- on antihypertensives. Denies sob, dyspnea, dizziness. alert and oriented x 4.

## 2023-06-16 NOTE — ED STATDOCS - NS_ ATTENDINGSCRIBEDETAILS _ED_A_ED_FT
I, Quan Mcpherson MD,  performed the initial face to face bedside interview with this patient regarding history of present illness, review of symptoms and relevant past medical, social and family history.  I completed an independent physical examination.  I was the initial provider who evaluated this patient.   I personally saw the patient and performed a substantive portion of the visit including all aspects of the medical decision making.  The history, relevant review of systems, past medical and surgical history, medical decision making, and physical examination was documented by the scribe in my presence and I attest to the accuracy of the documentation.

## 2023-06-16 NOTE — ED PROVIDER NOTE - OBJECTIVE STATEMENT
71 y/o female with PMHx of HTN, CAD, diverticulitis presents to the ED c/o sudden onset chest pain x5 days, with sweating x1 day. Patient has been taking nitroglycerin every day since onset of symptoms without relief, pain radiates to jaw. concern for unstable angina. Last stress test 12/2022. +Smoker, 8 cigarettes a day.  Cardiologist: Dr. Duke

## 2023-06-16 NOTE — ED PROVIDER NOTE - CLINICAL SUMMARY MEDICAL DECISION MAKING FREE TEXT BOX
having episode of cp radiating into left side of jaw and left shoulder so we ordered stat ekg. pt has normal trop. discuss admission due to hx.

## 2023-06-16 NOTE — ED PROVIDER NOTE - PHYSICAL EXAMINATION
Gen: Well appearing in NAD  Head:  NC/AT  HEENT: pupils perrl, no pharyngeal erythema, uvula midline  Cardiac: S1S2, RRR  Abd: Soft, non tender  Resp: No distress, CTA   musculoskeletal: no deformities, no swelling, strength +5/+5  Skin: warm and dry as visualized, no rashes  Neuro: NEIL, aao x 4  Psych: alert, cooperative, appropriate mood and affect for situation

## 2023-06-16 NOTE — H&P ADULT - ASSESSMENT
Pt is admitted w/    ACS  Chest pain with radiation to the jaw  Diaphoresis  Tobacco Dependence    - admit to tele  - cont asa 325MG and nitrogly sublingual  - repeat troponins, EKG  - cont lisinopril, metorolol  - add statin  - fasting lipids, BMP,   - cardiology consult  - pt counseled on smoking cessation  - DVTproph; ambulation  - Full Code Pt is admitted w/    ACS  Chest pain with radiation to the jaw  Diaphoresis  Tobacco Dependence    - admit to tele  - cont asa 325MG and nitrogly sublingual  - repeat troponins, EKG  - cont lisinopril, metoprolol  - add statin  - fasting lipids, BMP,   - cardiology consult  - pt counseled on smoking cessation  - DVTproph; ambulation  - Full Code

## 2023-06-16 NOTE — ED ADULT NURSE NOTE - NSFALLUNIVINTERV_ED_ALL_ED
Bed/Stretcher in lowest position, wheels locked, appropriate side rails in place/Call bell, personal items and telephone in reach/Instruct patient to call for assistance before getting out of bed/chair/stretcher/Non-slip footwear applied when patient is off stretcher/[place] to call system/Physically safe environment - no spills, clutter or unnecessary equipment/Purposeful proactive rounding/Room/bathroom lighting operational, light cord in reach

## 2023-06-16 NOTE — H&P ADULT - NSHPSOCIALHISTORY_GEN_ALL_CORE
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  She lives alone.  She smokes 8 cig day.  Hx of 25 pack yrs.  She has 1 glass of wine/beer with dinner.  No drug use.

## 2023-06-16 NOTE — PATIENT PROFILE ADULT - FALL HARM RISK - HARM RISK INTERVENTIONS

## 2023-06-16 NOTE — ED ADULT NURSE NOTE - NSFALLRISKASMT_ED_ALL_ED_DT
Subjective:       Patient ID: Laura Kent is a 40 y.o. female.    Chief Complaint: MD Malignant neoplasm of upper-outer quadrant of left breas    Here for follow up and to resume chemo  In there interval, admitted with pancreatitis. Chart review.   On steroid- Prednisone 30mg and tonight will start 20mg daily.    Today, feels good.   No pain issues.   No fever/chills.   No n/v.   Bowels ok   No abd pain  Menstrual cycle returned. (Last was 8/1/2022). Seeing GYN tomorrow.     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 She had some slow improvement over a few days then not much progress. GI consulted and steroids were restarted with good effect. She was placed on an extended steroid taper of steroids starting at 40mg and will complete her last dose of 5mg on 12/6/2022. She is to follow up with oncology for her appointment next week. Given return precautions.       Returns today with original plan to be restarting NA therapy without immunotherapy     10/20/2022 Ultrasound left breast:  Findings:  At the site of the marker from the prior malignant breast biopsy,there is a 6 mm x 7 mm x 3 mm oval, parallel, hypoechoic mass with indistinct margins seen in the left breast at 2 o'clock in the posterior depth, 8 cm from the nipple. This is difficult to distinguish from the Twirl marker itself.  At the site of the previously biopsied metastatic left axillary node, there is a 10 x 7 x 11 mm node with a Uyen- marker. Node has mild residual cortical thickening (5 mm), but has decreased in size. There are a few other normal size, normal appearing left axillary nodes.   Impression:  Left  Mass: Left breast 6 mm x 7 mm x 3 mm mass at the posterior 2 o'clock position. Assessment: 6 - Known biopsy, proven malignancy. Known, previously biopsied left breast cancer and metastatic left axillary node have both decreased in size. The breast mass is now  difficult to distinguish from the biopsy marker.   BI-RADS Category:   Overall: 6 - Known Biopsy-Proven Malignancy  Recommendation:  Patient is already under the care of the Breast Oncology team. Most recent bilateral mammogram was performed in February 2022.      Most recent imaging regarding pancreatitis:  - 9/16/2022 MRI Abdomen:  FINDINGS:  ABDOMEN:  LIVER: Enlarged measuring 19.8 cm in craniocaudal dimension.  Normal background signal.  Two punctate nonenhancing T2 hyperintensities consistent with cysts.  Portal and hepatic veins are patent.  SPLEEN: Enlarged measuring 12.0 cm in craniocaudal dimension.  No focal lesion.  PANCREAS: Normal signal/enhancement.  No significant peripancreatic inflammatory changes.  No solid mass.  ADRENAL: Normal.  KIDNEY: No solid renal mass.  No hydronephrosis.  RETROPERITONEUM: No adenopathy.  MISCELLANEOUS: Multiple loops of prominent air/fluid-filled small bowel in the mid abdomen measuring up to 3.3 cm in maximum diameter, partially visualized.  Small volume bilateral dependent pleural fluid.  No aneurysm.  No focal suspicious osseous lesion.  MRCP:  Mildly distended gallbladder. No cholelithiasis.  Normal caliber intra and extrahepatic biliary ducts.  Normal tapering of common bile duct at the level of the ampulla.  No focal stricture or filling defect.  Pancreatic duct measures within normal limits. No ductal strictures present.  Impression:  Satisfactory MRCP evaluation.  No abnormal biliary duct dilatation.  No cholelithiasis or choledocholithiasis.  Multiple prominent loops of air/fluid-filled small bowel in the mid abdomen, incompletely characterized.  Could represent ileus.  Recommend correlation with clinical history and exam.  Hepatosplenomegaly.  Small bilateral dependent pleural effusions.  Additional findings as above     - 9/8/2022 CT Abdomen:  FINDINGS:  Heart: Normal in size.  Trace pericardial effusion  Lung Bases: 0.5 cm pulmonary nodule in the left lower  lobe (axial series 2, image 31), stable in comparison to CT 03/30/2022.  trace bilateral effusions.  Liver: Enlarged in size, measures 20.1 cm in craniocaudal dimension.  No focal lesion.  Gallbladder: No calcified gallstones.  Bile Ducts: No evidence of dilated ducts.  Pancreas: Mild peripancreatic fat stranding slightly increased compared to September 3.  Multiple prominent lymph nodes, likely reactive.  Spleen: Enlarged in size, measures 14.7 cm in AP dimension.  Adrenals: Unremarkable.  Kidneys/ Ureters: Both kidneys are normal in size and location.  No nephrolithiasis or hydronephrosis.  No solid renal masses.  Both ureters are normal in course and caliber with no hydroureter.  Bladder: No evidence of wall thickening.  Reproductive organs: IUD device in place.  GI Tract/Mesentery: The stomach is unremarkable.  No evidence of bowel obstruction or inflammation.  The appendix is unremarkable.  Peritoneal Space: No ascites. No free air.  Retroperitoneum: Scattered nonenlarged para-aortic nodes noted.  Abdominal wall: Scattered foci of air in the subcutaneous tissue along the anterior abdominal wall, likely representing sites of injection.  Vasculature: No significant atherosclerosis or aneurysm.  Bones: No acute fracture.  Impression:  Slightly increased mild peripancreatic fat stranding with surrounding multiple prominent lymph nodes, allowing for limited evaluation due to lack of IV contrast.  Findings consistent with the clinical history of pancreatitis, no loculated fluid collections.  Stable 0.5 cm pulmonary nodule in the left lower lobe.  Hepatosplenomegaly.  Small pericardial effusion and trace bilateral pleural effusion.  Additional findings, as above.     Admitted 9/3-9/16/2022   Cancelled last week's follow up appointment as had eye appointment the same day   Per discharge note:  Hospital Course: Ms. Pacheco was initially admitted for abdominal pain likely from pancreatitis due to her cancer treatment  regimen which she received 8/29. Lipase was found to be in the 200s with elevated LFTs with pancreatic fat stranding seen on CT. She was treated with anti-emetics, IVF, pain medications. She became febrile the night after admission. Broad spectrum antibiotics as well as Flagyl were started day 2 of hospitalization for intraabdominal coverage, but continued to remain febrile. She also developed copious diarrhea after antibiotics were started. She continued to remain febrile through 9/8 with Tmax of 103. Infectious Disease was consulted. Stool studies were ordered which were negative C.Diff negative x2. She completed 5 day course of Unasyn. Daptomycin started for history of VRE. Infectious work-up negative and CT chest/abd/pelvis without acute findings. DVT/PE ruled out.   RUQ US ordered to evaluate biliary tract and liver, showing biliary sludge. MRCP was ordered per GI recommendations which showed biliary sludge with no stones. She continued to have diarrhea throughout her admission up to 20 times a day, watery, and sometimes green in color. As her infectious work-up was negative concern grew for immunotherapy induced colitis. Loperamide, lomotil, Creon administered prn helped reduce episodes to 3-4 times daily w/ puree consistency. She was also started on high dose methylprednisolone for potential colitis. Her stools have become more formed and are now brown and soft however she is still having 2-3 bowel movements daily. Daptomycin was discontinued per ID guidance as it may have contributed to diarrhea.   She was switched to prednisone 90mg on taper. Will follow-up outpatient for colonoscopy and IBD evaluation.  She will follow-up with Dr. Cruz once colonoscopy complete.         Oncology History:  - self detected an area under her left arm and it initially seemed to go away (noted around winter holidays)  - 3/4/2022 Outside Mammogram:  Findings:  The breasts are heterogeneously dense, which may obscure small  masses.   Left  There is a 31 mm x 18 mm x 21 mm irregularly shaped mass with microlobulated margins seen in the left breast at 2 o'clock in the posterior depth with associated left axillary adenopathy. Largest node depicted on the outside study measures 49 x 23 x 29 mm with cortical thickening and effacement of the hilum. Breast mass is reportedly palpable.   Right  There is no evidence of suspicious masses, calcifications, or other abnormal findings in the right breast.  Impression:  Left  Mass: Left breast 31 mm x 18 mm x 21 mm mass at the posterior 2 o'clock position. Assessment: 5 - Highly suggestive of malignancy. Biopsy is recommended.   Right  There is no mammographic evidence of malignancy in the right breast.  BI-RADS Category:   Overall: 5 - Highly Suggestive of Malignancy  Recommendation:  Ultrasound Guided Core Needle Biopsy of the left breast mass and one of the abnormal left axillary nodes is recommended.     - 3/14/2022 Breast biopsy:  1. LEFT BREAST MASS, 2 O'CLOCK, BIOPSY:   Invasive ductal carcinoma, [place] histologic grade 3 (tubule formation:   3, nuclear pleomorphism:  3, mitotic activity:  3).   Comment:  Infiltrating carcinoma occupies the entirety of biopsied material   with the largest continuous focus measuring 13 mm.  Breast biomarkers are   pending and will be issued in a supplemental report.   2. LEFT AXILLARY LYMPH NODE, BIOPSY:   Invasive ductal carcinoma, [place] histologic grade 3 (tubule formation:   3, nuclear pleomorphism:  3, mitotic activity:  3).   Comment:  Infiltrating carcinoma occupies the entirety of biopsied material   with the largest continuous focus measuring 20 mm.  No lymph node tissue is   identified in the sample.  Tumor histology is essentially identical to that   of part 1.  The findings could represent part of a larger intranodal   metastasis, but an extension from the primary tumor cannot be excluded.   Radiographic correlation is advised.   Note:    Stephanie Rao also reviewed this case and agrees with the   diagnosis.   BREAST BIOMARKER RESULTS   Estrogen receptor (ER):  Negative (0)   Progesterone receptor (ER):  Negative (0)   HER2 IHC:  Negative (1+)   Ki-67 proliferation index:  80%      - 3/17/2022 Breast MRI:  Findings:  The breasts have heterogeneous fibroglandular tissue. The background parenchymal enhancement is minimal.   Left  There is a 38 mm x 33 mm x 33 mm irregularly shaped mass seen in the left breast at 2 o'clock in the posterior depth, 8.4 cm from the nipple and 1.2 cm from the skin. Associated signal void from a twirl biopsy marker; pathology showed invasive ductal carcinoma.   Posterior to the mass there is abnormal non mass enhancement measuring 29 x 28 mm. The NME is 4 mm from the skin and 2.3 cm from the chest wall. In total the mass and NME measure 54 mm in AP extent.  Overlying skin thickening and edema involving the lateral breast, likely from lymphovascular obstruction. No suspicious skin enhancement. Overall increased vascularity to the left breast.   Four abnormal lymph level 1 and level 2 left axillary lymph nodes. The largest lymph node measures 52 mm x 40 mm x 38 mm which previously underwent biopsy showing metastatic disease. There is an associated radar reflector within the biopsied lymph node.  Right  There is no evidence of suspicious masses, abnormal enhancement, or other abnormal findings in the right breast. No enlarged axillary or internal mammary lymph nodes.   Impression:  Left  Mass: Left breast 38 mm x 33 mm x 33 mm mass at the posterior 2 o'clock position. Assessment: 6 - Known biopsy, proven malignancy. Associated 29 mm NME extending posteriorly from the mass. In total the mass and NME measure 54 mm in AP extent.  Lymph Node: Abnormal level 1 and 2 left axillary lymph nodes, the largest of which measures 52 mm x 40 mm x 38 mm lymph node and is known biopsy, proven malignancy.   Right  There is no MR evidence of  malignancy in the right breast.  BI-RADS Category:   Overall: 6 - Known Biopsy-Proven Malignancy  Recommendation:  Clinical management of known left breast cancer. Patient is established with the breast surgery clinic.      - 3/21/2022 CT C/A/P:  FINDINGS:  Base of Neck: No significant abnormality.  Thoracic soft tissue: A proximally 3.2 x 3.6 x 3.7 cm irregular left breast mass within the lateral aspect with internal biopsy marker, corresponding to findings on prior breast MRI and compatible with malignancy.  Enlarged left axillary lymph nodes, largest measuring approximately 4.2 x 3.6 cm, (series 2, image 27).  CHEST:  -Heart: Normal size. No pericardial effusion.  -Pulmonary vasculature: Pulmonary arteries distribute normally.  There are four pulmonary veins.  -Radha/Mediastinum: No pathologic shelly enlargement.  -Trachea and Proximal airways: Trachea is midline.  Central airways are patent.  No significant bronchial wall thickening or bronchiectasis.  -Lungs/Pleura: Symmetrically expanded without focal consolidation, pneumothorax, or mass.  No pleural effusion or thickening.  -Esophagus: Normal course and caliber.  ABDOMEN:  - Liver: Normal in size and attenuation with no focal hepatic abnormality.  - Gallbladder: No calcified gallstones.  No wall thickening or pericholecystic fluid.  - Bile Ducts: No intra or extrahepatic biliary ductal dilatation.  - Stomach/Duodenum: Small hiatal hernia otherwise unremarkable.  - Spleen: Normal size.  No focal parenchymal abnormality.  - Pancreas: No mass lesion.  No pancreatic ductal dilatation.  No peripancreatic fat stranding.  - Adrenals: Unremarkable.  - Kidneys/ureters/urinary bladder: Normal in size and location.  Normal enhancement pattern.  No nephrolithiasis.  Ureters are normal in course and caliber.  No hydroureteronephrosis.  - Retroperitoneum: Scattered nonenlarged retroperitoneal lymph nodes.  PELVIS:  - Reproductive: Intrauterine device present.  A proximally  2.8 cm peripherally enhancing right ovarian cyst, likely corpus luteal cyst.  - Other: No pelvic adenopathy, free fluid, or mass.  BOWEL/MESENTERY:  No evidence of bowel obstruction or inflammatory process. Appendix is visualized and unremarkable.  VASCULATURE: Left-sided aortic arch with 3 branch vessels.  No aneurysm and no significant atherosclerosis.  Portal vein, splenic vein, and SMV are patent.  BONES: [place] approximately 0.6 cm right trochanteric lesion, likely synovial herniation pit.  No acute fracture or bony destructive process.  EXTRATHORACIC/EXTRAPERITONEAL SOFT TISSUES: Unremarkable.  Impression:  In this patient with known breast cancer, there is an approximately 3.7 cm irregular left breast mass with left axillary lymphadenopathy.  No evidence of metastatic disease  Small hiatal hernia.  Additional findings as described above.     - 3/21/2022 Bone scan:  FINDINGS:  There is physiologic distribution of the radiopharmaceutical throughout the skeleton.  Mild degenerative uptake within the bilateral shoulders and knees.  There is normal uptake in the genitourinary system and soft tissues.  Impression:  There is no scintigraphic evidence of osteoblastic metastatic disease.     - 3/21/2022 Brain MRI:  FINDINGS:  Please note there is dental metal artifact distorting the examination.  Allowing for artifacts the brain parenchyma is normal in contour.  Developmental variant with cavum septum pellucidum identified.  The ventricles are otherwise normal in size without hydrocephalus.  There is no midline shift or significant mass effect.  The major intracranial T2 flow voids are present.  No restricted diffusion within the non distorted brain parenchyma.  There is severe distortion of the susceptibility imaging no parenchymal susceptibility to suggest parenchymal hemorrhage in the non distorted brain parenchyma.  There is suboptimal evaluation of the cerebellum and posterior fossa.  Impression:  Unremarkable  MRI brain allowing for artifacts from dental metal as detailed above specifically without abnormal parenchymal enhancement to suggest intracranial metastatic disease in light of history.     - Initiated NA therapy on 2022  C4 held with pancreatitis     PMH:  HTN- around pregnancies; off of blood pressure medication x 2 years  CHF- ECHOs at [facility]  Gestational DM  Hyperlipidemia when heavier, managed with diet and followed by cardiology  C- sections x 2  Iron deficiency     GynHx:  Menarche- 10   (19 at 1st pregnancy) (20, 10, 7)  Still with periods - last 2022- last 5-7 days, moderate normal flow  + breast feeding x 1 year  IUD     SH:  Working, , [employer]  Single  Good support system  No tobacco  No EtOH  No illicit drugs      Review of Systems   Constitutional:         See above   All other systems reviewed and are negative.      Objective:      Physical Exam  Vitals and nursing note reviewed.   Constitutional:       General: She is not in acute distress.     Appearance: Normal appearance. She is well-developed. She is not ill-appearing.      Comments: Presents alone  Very pleasant  ECOG= 0   HENT:      Head: Normocephalic and atraumatic.   Eyes:      General: Lids are normal. No scleral icterus.     Extraocular Movements: Extraocular movements intact.      Conjunctiva/sclera: Conjunctivae normal.      Pupils: Pupils are equal, round, and reactive to light.   Neck:      Thyroid: No thyromegaly.      Vascular: No JVD.      Trachea: Trachea normal.   Cardiovascular:      Rate and Rhythm: Normal rate and regular rhythm.      Heart sounds: Normal heart sounds. No murmur heard.    No friction rub. No gallop.   Pulmonary:      Effort: Pulmonary effort is normal. No respiratory distress.      Breath sounds: Normal breath sounds. No wheezing, rhonchi or rales.      Comments: Left breast mass smaller. No LAD  Right sided  port clean and dry - incision healed.   Abdominal:      General:  Bowel sounds are normal. There is no distension.      Palpations: Abdomen is soft. There is no mass.      Tenderness: There is no abdominal tenderness. There is no guarding or rebound.      Comments: No pain   Musculoskeletal:         General: No swelling or deformity. Normal range of motion.      Cervical back: Normal range of motion and neck supple.      Right lower leg: No edema.      Left lower leg: No edema.      Comments: No spinal or paraspinal tenderness.    Lymphadenopathy:      Head:      Right side of head: No submental or submandibular adenopathy.      Left side of head: No submental or submandibular adenopathy.      Cervical: No cervical adenopathy.      Upper Body:      Right upper body: No supraclavicular or axillary adenopathy.      Left upper body: No supraclavicular or axillary adenopathy.   Skin:     General: Skin is warm and dry.      Capillary Refill: Capillary refill takes less than 2 seconds.      Coloration: Skin is not jaundiced or pale.      Findings: No bruising.      Nails: There is no clubbing.      Comments: acne to back   Neurological:      General: No focal deficit present.      Mental Status: She is alert and oriented to person, place, and time. Mental status is at baseline.      Sensory: No sensory deficit.      Motor: No weakness.      Coordination: Coordination normal.      Gait: Gait normal.   Psychiatric:         Mood and Affect: Mood normal.         Speech: Speech normal.         Behavior: Behavior normal.         Thought Content: Thought content normal.       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 Will leave on Prednisone 20mg daily and reassess for wean in 2 weeks.   Resume chemo today - ddAC (first of 4 cycles); udenyca tomorrow as scheduled.   Antiemetics discussed as has supply at home  Lipase with next labs in 2 weeks  Echo after 2nd adriamycin    Route Chart for Scheduling    Med Onc Chart Routing      Follow up with physician 2 weeks. see Dr. Cruz in 2 weeks with cbc, cmp, lipase, bhcg and for ddAC with udenyca day 2   Follow up with ANDREW    Infusion scheduling note    Injection scheduling note    Labs    Imaging    Pharmacy appointment    Other referrals        Treatment Plan Information   OP PEMBROLIZUMAB CARBOPLATIN (AUC 5) WITH WEEKLY PACLITAXEL FOLLOWED BY DOSE DENSE DOXORUBICIN CYCLOPHOSPHAMIDE Q2W   Yessy Cruz MD   Upcoming Treatment Dates - OP PEMBROLIZUMAB CARBOPLATIN (AUC 5) WITH WEEKLY PACLITAXEL FOLLOWED BY DOSE DENSE DOXORUBICIN CYCLOPHOSPHAMIDE Q2W    12/5/2022       Chemotherapy       DOXOrubicin chemo injection 130 mg       cyclophosphamide 600 mg/m2 = 1,300 mg in sodium chloride 0.9% 250 mL chemo infusion       Antiemetics       aprepitant (CINVANTI) injection 130 mg       palonosetron 0.25mg/dexAMETHasone 12mg in NS IVPB  12/19/2022       Chemotherapy       DOXOrubicin chemo injection 130 mg       cyclophosphamide 600 mg/m2 = 1,300 mg in sodium chloride 0.9% 250 mL chemo infusion       Antiemetics       aprepitant (CINVANTI) injection 130 mg       palonosetron 0.25mg/dexAMETHasone 12mg in NS IVPB  1/2/2023       Chemotherapy       DOXOrubicin chemo injection 130 mg       cyclophosphamide 600 mg/m2 = 1,300 mg in sodium chloride 0.9% 250 mL chemo infusion       Antiemetics       aprepitant (CINVANTI) injection 130 mg       palonosetron 0.25mg/dexAMETHasone 12mg in NS IVPB    Supportive Plan Information  OP FILGRASTIM 300 MCG    Yessy Cruz MD   Upcoming Treatment Dates - OP FILGRASTIM 300 MCG    6/20/2022       Medications       tbo-filgrastim (GRANIX) injection 480 mcg/0.8 mL  6/21/2022       Medications       tbo-filgrastim (GRANIX) injection 480 mcg/0.8 mL  6/22/2022       Medications       tbo-filgrastim (GRANIX) injection 480 mcg/0.8 mL  6/23/2022       Medications       tbo-filgrastim (GRANIX) injection 480 mcg/0.8 mL    Therapy Plan Information  Flushes  heparin, porcine (PF) 100 unit/mL injection flush 500 Units  500 Units, Intravenous, Every visit  sodium chloride 0.9% flush 10 mL  10 mL, Intravenous, Every visit  heparin, porcine (PF) 100 unit/mL injection flush 500 Units  500 Units, Intravenous, Every visit  sodium chloride 0.9% flush 10 mL  10 mL, Intravenous, Every visit  Patient is in agreement with the proposed treatment plan. All questions were answered to the patient's satisfaction. Pt knows to call clinic for any new or worsening symptoms and if anything is needed before the next clinic visit.      SHANDRA HesterP-C  Hematology & Oncology  [facility] [place] [address]  ph. [phone]  Fax. [phone]     Face to Face time with patient: 35 minutes  45 minutes of total time spent on the encounter, which includes face to face time and non-face to face time preparing to see the patient (eg, review of tests), Obtaining and/or reviewing separately obtained history, Documenting clinical information in the electronic or other health record, Independently interpreting results (not separately reported) and communicating results to the patient/family/caregiver, or Care coordination (not separately reported).                  16-Jun-2023 16:05

## 2023-06-16 NOTE — H&P ADULT - NSHPLABSRESULTS_GEN_ALL_CORE
my interpret:  15:13 EKG:   SR 72 bpm, 1st degr AVB, T wave inv in v1-v3    17:35 EKG:   SR  69 bpm, 1st degr AVB, T wave inv in v1-v3    (similar to 3/11/2019 EKG)

## 2023-06-16 NOTE — H&P ADULT - CONVERSATION DETAILS
Pt is Full code.  She would like her cousin, Kim Marcial to be her HCP,  [phone]  as pt feels she best knows pt's wishes.

## 2023-06-16 NOTE — ED STATDOCS - PROGRESS NOTE DETAILS
Jayjay Riggs for Dr. Mcpherson: 71 y/o female with PMHx of HTN, CAD, diverticulitis presents to the ED c/o sudden onset chest pain x5 days, with sweating x1 day. Patient has been taking nitroglycerin every day since onset of symptoms without relief, stating the pain is different from her previous chest pain. Last stress test 12/2022. +Smoker, 8 cigarettes a day. Will send to [facility] for further evaluation.   Cardiologist: Dr. Duke Jayjay Riggs for Dr. Mcpherson: 73 y/o female with PMHx of HTN, CAD, diverticulitis presents to the ED c/o sudden onset chest pain x5 days, with sweating x1 day. Patient has been taking nitroglycerin every day since onset of symptoms without relief, pain radiates to jaw. concern for unstable angina Last stress test 12/2022. +Smoker, 8 cigarettes a day. Will send to [facility] for further evaluation.   Cardiologist: Dr. Duke

## 2023-06-16 NOTE — H&P ADULT - HISTORY OF PRESENT ILLNESS
Pt is a pleasant 71 y/o female with PMHx of HTN, CAD, Diverticulitis, smoing history who  presented to [facility] c/o worsening  chest pain  for the last five days.  She reports pain began under hr left breast several days ago with radiation to the neck.  Pain was 4/10  decribed as a  squeezing tightness in her  chest.   At times pain wakes the patient from sleep.  She has had radiation to the nicole and yesterday reported diaphorosis.  Pain has been at rest and worse with exertion.  It has lasted for 45 min at times.  Pt also notes a sensation of burping with her symptoms.  She has been taking nitroglycerin every day since onset of symptoms.   She has been taking a full ASA daily.     Pt has chronic mild dyspnea.  No fever/chills/URI / UTI  or abd symptoms.     Pt had a routine stress test 12/2022 and echo which was reportedly negative.

## 2023-06-17 LAB
A1C WITH ESTIMATED AVERAGE GLUCOSE RESULT: 5.8 % — HIGH (ref 4–5.6)
ADD ON TEST-SPECIMEN IN LAB: SIGNIFICANT CHANGE UP
ANION GAP SERPL CALC-SCNC: 4 MMOL/L — LOW (ref 5–17)
BUN SERPL-MCNC: 13 MG/DL — SIGNIFICANT CHANGE UP (ref 7–23)
CALCIUM SERPL-MCNC: 9.2 MG/DL — SIGNIFICANT CHANGE UP (ref 8.5–10.1)
CHLORIDE SERPL-SCNC: 106 MMOL/L — SIGNIFICANT CHANGE UP (ref 96–108)
CHOLEST SERPL-MCNC: 144 MG/DL — SIGNIFICANT CHANGE UP
CO2 SERPL-SCNC: 30 MMOL/L — SIGNIFICANT CHANGE UP (ref 22–31)
CREAT SERPL-MCNC: 0.44 MG/DL — LOW (ref 0.5–1.3)
EGFR: 103 ML/MIN/1.73M2 — SIGNIFICANT CHANGE UP
ESTIMATED AVERAGE GLUCOSE: 120 MG/DL — HIGH (ref 68–114)
GLUCOSE SERPL-MCNC: 97 MG/DL — SIGNIFICANT CHANGE UP (ref 70–99)
HCV AB S/CO SERPL IA: 0.06 S/CO — SIGNIFICANT CHANGE UP (ref 0–0.99)
HCV AB SERPL-IMP: SIGNIFICANT CHANGE UP
HDLC SERPL-MCNC: 57 MG/DL — SIGNIFICANT CHANGE UP
LIPID PNL WITH DIRECT LDL SERPL: 60 MG/DL — SIGNIFICANT CHANGE UP
NON HDL CHOLESTEROL: 87 MG/DL — SIGNIFICANT CHANGE UP
POTASSIUM SERPL-MCNC: 4 MMOL/L — SIGNIFICANT CHANGE UP (ref 3.5–5.3)
POTASSIUM SERPL-SCNC: 4 MMOL/L — SIGNIFICANT CHANGE UP (ref 3.5–5.3)
SODIUM SERPL-SCNC: 140 MMOL/L — SIGNIFICANT CHANGE UP (ref 135–145)
TRIGL SERPL-MCNC: 133 MG/DL — SIGNIFICANT CHANGE UP
TROPONIN I, HIGH SENSITIVITY RESULT: 6.54 NG/L — SIGNIFICANT CHANGE UP

## 2023-06-17 PROCEDURE — 99233 SBSQ HOSP IP/OBS HIGH 50: CPT

## 2023-06-17 PROCEDURE — 93010 ELECTROCARDIOGRAM REPORT: CPT

## 2023-06-17 RX ORDER — NITROGLYCERIN 6.5 MG
0.4 CAPSULE, EXTENDED RELEASE ORAL
Refills: 0 | Status: DISCONTINUED | OUTPATIENT
Start: 2023-06-17 | End: 2023-06-19

## 2023-06-17 RX ORDER — CLOPIDOGREL BISULFATE 75 MG/1
75 TABLET, FILM COATED ORAL DAILY
Refills: 0 | Status: DISCONTINUED | OUTPATIENT
Start: 2023-06-17 | End: 2023-06-19

## 2023-06-17 RX ORDER — LISINOPRIL 2.5 MG/1
5 TABLET ORAL DAILY
Refills: 0 | Status: DISCONTINUED | OUTPATIENT
Start: 2023-06-17 | End: 2023-06-19

## 2023-06-17 RX ORDER — OXYCODONE HYDROCHLORIDE 5 MG/1
10 TABLET ORAL EVERY 4 HOURS
Refills: 0 | Status: DISCONTINUED | OUTPATIENT
Start: 2023-06-17 | End: 2023-06-19

## 2023-06-17 RX ORDER — ASPIRIN/CALCIUM CARB/MAGNESIUM 324 MG
325 TABLET ORAL DAILY
Refills: 0 | Status: DISCONTINUED | OUTPATIENT
Start: 2023-06-16 | End: 2023-06-19

## 2023-06-17 RX ORDER — METOPROLOL TARTRATE 50 MG
25 TABLET ORAL DAILY
Refills: 0 | Status: DISCONTINUED | OUTPATIENT
Start: 2023-06-17 | End: 2023-06-19

## 2023-06-17 RX ORDER — PANTOPRAZOLE SODIUM 20 MG/1
40 TABLET, DELAYED RELEASE ORAL
Refills: 0 | Status: DISCONTINUED | OUTPATIENT
Start: 2023-06-17 | End: 2023-06-19

## 2023-06-17 RX ORDER — ATORVASTATIN CALCIUM 80 MG/1
40 TABLET, FILM COATED ORAL AT BEDTIME
Refills: 0 | Status: DISCONTINUED | OUTPATIENT
Start: 2023-06-17 | End: 2023-06-19

## 2023-06-17 RX ORDER — OXYCODONE AND ACETAMINOPHEN 5; 325 MG/1; MG/1
2 TABLET ORAL EVERY 4 HOURS
Refills: 0 | Status: DISCONTINUED | OUTPATIENT
Start: 2023-06-17 | End: 2023-06-17

## 2023-06-17 RX ORDER — TRIAMTERENE/HYDROCHLOROTHIAZID 75 MG-50MG
1 TABLET ORAL DAILY
Refills: 0 | Status: DISCONTINUED | OUTPATIENT
Start: 2023-06-17 | End: 2023-06-19

## 2023-06-17 RX ORDER — MAGNESIUM OXIDE 400 MG ORAL TABLET 241.3 MG
400 TABLET ORAL
Refills: 0 | Status: DISCONTINUED | OUTPATIENT
Start: 2023-06-17 | End: 2023-06-19

## 2023-06-17 RX ORDER — ATORVASTATIN CALCIUM 80 MG/1
10 TABLET, FILM COATED ORAL AT BEDTIME
Refills: 0 | Status: DISCONTINUED | OUTPATIENT
Start: 2023-06-17 | End: 2023-06-17

## 2023-06-17 RX ORDER — FAMOTIDINE 10 MG/ML
40 INJECTION INTRAVENOUS DAILY
Refills: 0 | Status: DISCONTINUED | OUTPATIENT
Start: 2023-06-17 | End: 2023-06-19

## 2023-06-17 RX ADMIN — LISINOPRIL 5 MILLIGRAM(S): 2.5 TABLET ORAL at 09:32

## 2023-06-17 RX ADMIN — MAGNESIUM OXIDE 400 MG ORAL TABLET 400 MILLIGRAM(S): 241.3 TABLET ORAL at 09:32

## 2023-06-17 RX ADMIN — OXYCODONE HYDROCHLORIDE 10 MILLIGRAM(S): 5 TABLET ORAL at 14:47

## 2023-06-17 RX ADMIN — Medication 25 MILLIGRAM(S): at 09:33

## 2023-06-17 RX ADMIN — CLOPIDOGREL BISULFATE 75 MILLIGRAM(S): 75 TABLET, FILM COATED ORAL at 09:32

## 2023-06-17 RX ADMIN — OXYCODONE HYDROCHLORIDE 10 MILLIGRAM(S): 5 TABLET ORAL at 23:02

## 2023-06-17 RX ADMIN — Medication 25 MILLIGRAM(S): at 00:46

## 2023-06-17 RX ADMIN — ATORVASTATIN CALCIUM 40 MILLIGRAM(S): 80 TABLET, FILM COATED ORAL at 21:20

## 2023-06-17 RX ADMIN — Medication 1 TABLET(S): at 05:25

## 2023-06-17 RX ADMIN — OXYCODONE HYDROCHLORIDE 10 MILLIGRAM(S): 5 TABLET ORAL at 14:17

## 2023-06-17 RX ADMIN — MAGNESIUM OXIDE 400 MG ORAL TABLET 400 MILLIGRAM(S): 241.3 TABLET ORAL at 21:21

## 2023-06-17 RX ADMIN — OXYCODONE HYDROCHLORIDE 10 MILLIGRAM(S): 5 TABLET ORAL at 19:01

## 2023-06-17 RX ADMIN — Medication 325 MILLIGRAM(S): at 09:33

## 2023-06-17 RX ADMIN — OXYCODONE HYDROCHLORIDE 10 MILLIGRAM(S): 5 TABLET ORAL at 23:35

## 2023-06-17 NOTE — PROGRESS NOTE ADULT - SUBJECTIVE AND OBJECTIVE BOX
HOSPITALIST ATTENDING PROGRESS NOTE    Chart and meds reviewed.  Patient seen and examined.    CC: CP    Subjective: Denies CP, SOB, diaphoresis.     All other systems reviewed and found to be negative with the exception of what has been described above.    MEDICATIONS  (STANDING):  aspirin enteric coated 325 milliGRAM(s) Oral daily  atorvastatin 40 milliGRAM(s) Oral at bedtime  clopidogrel Tablet 75 milliGRAM(s) Oral daily  lisinopril 5 milliGRAM(s) Oral daily  magnesium oxide 400 milliGRAM(s) Oral two times a day with meals  metoprolol tartrate 25 milliGRAM(s) Oral daily  pantoprazole    Tablet 40 milliGRAM(s) Oral before breakfast  triamterene 37.5 mG/hydrochlorothiazide 25 mG Tablet 1 Tablet(s) Oral daily    MEDICATIONS  (PRN):  famotidine    Tablet 40 milliGRAM(s) Oral daily PRN gerd  nitroglycerin     SubLingual 0.4 milliGRAM(s) SubLingual every 5 minutes PRN Chest Pain  oxyCODONE    IR 10 milliGRAM(s) Oral every 4 hours PRN Severe Pain (7 - 10)      VITALS:  T(F): 97.9 (23 @ 08:49), Max: 98 (23 @ 21:51)  HR: 64 (23 @ 08:49) (60 - 72)  BP: 150/60 (23 @ 08:49) (131/63 - 150/60)  RR: 18 (23 @ 08:49) (18 - 20)  SpO2: 94% (23 @ 08:49) (94% - 99%)  Wt(kg): --    I&O's Summary      CAPILLARY BLOOD GLUCOSE          PHYSICAL EXAM:  Gen: NAD  HEENT:  pupils equal and reactive, EOMI, no oropharyngeal lesions, erythema, exudates, oral thrush  NECK:   supple, no carotid bruits, no palpable lymph nodes, no thyromegaly  CV:  +S1, +S2, regular, no murmurs or rubs  RESP:   lungs clear to auscultation bilaterally, no wheezing, rales, rhonchi, good air entry bilaterally  BREAST:  not examined  GI:  abdomen soft, non-tender, non-distended, normal BS, no bruits, no abdominal masses, no palpable masses  RECTAL:  not examined  :  not examined  MSK:   normal muscle tone, no atrophy, no rigidity, no contractions  EXT:  no clubbing, no cyanosis, no edema, no calf pain, swelling or erythema  VASCULAR:  pulses equal and symmetric in the upper and lower extremities  NEURO:  AAOX3, no focal neurological deficits, follows all commands, able to move extremities spontaneously  SKIN:  no ulcers, lesions or rashes    LABS:                            12.1   9.08  )-----------( 234      ( 2023 15:54 )             36.4         140  |  106  |  13  ----------------------------<  97  4.0   |  30  |  0.44<L>    Ca    9.2      2023 05:44  Mg     1.4         TPro  6.7  /  Alb  3.1<L>  /  TBili  0.3  /  DBili  x   /  AST  8<L>  /  ALT  18  /  AlkPhos  57          LIVER FUNCTIONS - ( 2023 15:54 )  Alb: 3.1 g/dL / Pro: 6.7 gm/dL / ALK PHOS: 57 U/L / ALT: 18 U/L / AST: 8 U/L / GGT: x             Urinalysis Basic - ( 2023 17:04 )    Color: Yellow / Appearance: Clear / S.010 / pH: x  Gluc: x / Ketone: Negative  / Bili: Negative / Urobili: Negative   Blood: x / Protein: Negative / Nitrite: Negative   Leuk Esterase: Negative / RBC: 3-5 /HPF / WBC 3-5 /HPF   Sq Epi: x / Non Sq Epi: x / Bacteria: Few    CULTURES:  Blood, Urine: Trace ( @ 17:04)  no new    Additional results/Imaging, I have personally reviewed:  no new    Telemetry, personally reviewed:  23: sinus 60s

## 2023-06-17 NOTE — CONSULT NOTE ADULT - ASSESSMENT
Pt is a pleasant 73 y/o female with PMHx of HTN, CAD, Diverticulitis, smoking history who  presented to [facility] ED c/o worsening  chest pain  for the last five days.  She reports pain began under hr left breast several days ago with radiation to the neck.  Pain was 4/10  decribed as a  squeezing tightness in her  chest.   At times pain wakes the patient from sleep.  She has had radiation to the jaw and yesterday reported diaphorosis.  Pain has been at rest and worse with exertion.  It has lasted for 45 min at times.  Pt also notes a sensation of burping with her symptoms.  She has been taking nitroglycerin every day since onset of symptoms.   She has been taking a full ASA daily.     Pt has chronic mild dyspnea.  No fever/chills/URI / UTI  or abd symptoms.   6/17- she had a cardiac PET in December of 2022 that was negative for ischemia   Pt had a routine stress test 12/2022 and echo which was reportedly negative.   she had another episode this AM after her morning ekg that only lasted 1 minute and resolved   EKG shows no change from her baseline   1) will cont bblockers asa , if more chest pain would start IV hep and plavix for ACS   2) add plavix until cath monday   3) cont statins

## 2023-06-17 NOTE — CONSULT NOTE ADULT - SUBJECTIVE AND OBJECTIVE BOX
Patient is a 72y old  Female who presents with a chief complaint of Chest pain (2023 23:59)      HPI:  Pt is a pleasant 71 y/o female with PMHx of HTN, CAD, Diverticulitis, smoking history who  presented to [facility] ED c/o worsening  chest pain  for the last five days.  She reports pain began under hr left breast several days ago with radiation to the neck.  Pain was 4/10  decribed as a  squeezing tightness in her  chest.   At times pain wakes the patient from sleep.  She has had radiation to the jaw and yesterday reported diaphorosis.  Pain has been at rest and worse with exertion.  It has lasted for 45 min at times.  Pt also notes a sensation of burping with her symptoms.  She has been taking nitroglycerin every day since onset of symptoms.   She has been taking a full ASA daily.     Pt has chronic mild dyspnea.  No fever/chills/URI / UTI  or abd symptoms.   - she had a cardiac PET in 2022 that was negative for ischemia   Pt had a routine stress test 2022 and echo which was reportedly negative.   she had another episode this AM after her morning ekg that only lasted 1 minute and resolved   EKG shows no change from her baseline    (2023 23:59)      PAST MEDICAL & SURGICAL HISTORY:  HTN (hypertension)      Diverticulitis      Headache      Recurrent ventral hernia      Bilateral recurrent inguinal hernia without obstruction or gangrene      Yeast infection      Uterine leiomyoma, unspecified location  History of      Cysts of both ovaries      Depression      Osteopenia      Pain  hand and wrist      Tear of right rotator cuff, unspecified tear extent      Spondylosis      Herniated disc, cervical  and lumbar      Spondylosis      Hematuria      Gastroesophageal reflux disease without esophagitis      Irritable bowel syndrome with diarrhea      Hemorrhoids      Gall bladder disease      H/O microdiscectomy  x 2 ,       History of bowel resection  with ileostomy      H/O bilateral oophorectomy      H/O abdominal hysterectomy      S/P ORIF (open reduction internal fixation) fracture  right wrist      History of cholecystectomy      H/O ventral hernia repair                                        MEDICATIONS  (STANDING):  aspirin enteric coated 325 milliGRAM(s) Oral daily  atorvastatin 10 milliGRAM(s) Oral at bedtime  lisinopril 5 milliGRAM(s) Oral daily  magnesium oxide 400 milliGRAM(s) Oral two times a day with meals  metoprolol tartrate 25 milliGRAM(s) Oral daily  triamterene 37.5 mG/hydrochlorothiazide 25 mG Tablet 1 Tablet(s) Oral daily    MEDICATIONS  (PRN):  famotidine    Tablet 40 milliGRAM(s) Oral daily PRN gerd  nitroglycerin     SubLingual 0.4 milliGRAM(s) SubLingual every 5 minutes PRN Chest Pain  oxycodone    5 mG/acetaminophen 325 mG 1 Tablet(s) Oral every 6 hours PRN Moderate Pain (4 - 6)      FAMILY HISTORY:  Family history of melanoma (Mother)    Family history of Alzheimer's disease (Mother)    Family history of cancer (Father)        SOCIAL HISTORY:    CIGARETTES:        Vital Signs Last 24 Hrs  T(C): 36.4 (2023 22:10), Max: 36.7 (2023 21:51)  T(F): 97.6 (2023 22:10), Max: 98 (2023 21:51)  HR: 60 (2023 05:15) (60 - 72)  BP: 131/63 (2023 05:15) (131/63 - 148/70)  BP(mean): 86 (2023 21:51) (79 - 86)  RR: 18 (2023 22:10) (18 - 20)  SpO2: 99% (2023 22:10) (97% - 99%)    Parameters below as of 2023 22:10  Patient On (Oxygen Delivery Method): room air                INTERPRETATION OF TELEMETRY:    ECG:    I&O's Detail      LABS:                        12.1   9.08  )-----------( 234      ( 2023 15:54 )             36.4     06-17    140  |  106  |  13  ----------------------------<  97  4.0   |  30  |  0.44<L>    Ca    9.2      2023 05:44  Mg     1.4     06-16    TPro  6.7  /  Alb  3.1<L>  /  TBili  0.3  /  DBili  x   /  AST  8<L>  /  ALT  18  /  AlkPhos  57  06-16          Urinalysis Basic - ( 2023 17:04 )    Color: Yellow / Appearance: Clear / S.010 / pH: x  Gluc: x / Ketone: Negative  / Bili: Negative / Urobili: Negative   Blood: x / Protein: Negative / Nitrite: Negative   Leuk Esterase: Negative / RBC: 3-5 /HPF / WBC 3-5 /HPF   Sq Epi: x / Non Sq Epi: x / Bacteria: Few      I&O's Summary    BNP  RADIOLOGY & ADDITIONAL STUDIES:

## 2023-06-18 PROCEDURE — 99232 SBSQ HOSP IP/OBS MODERATE 35: CPT

## 2023-06-18 RX ADMIN — OXYCODONE HYDROCHLORIDE 10 MILLIGRAM(S): 5 TABLET ORAL at 03:36

## 2023-06-18 RX ADMIN — ATORVASTATIN CALCIUM 40 MILLIGRAM(S): 80 TABLET, FILM COATED ORAL at 21:03

## 2023-06-18 RX ADMIN — OXYCODONE HYDROCHLORIDE 10 MILLIGRAM(S): 5 TABLET ORAL at 18:57

## 2023-06-18 RX ADMIN — OXYCODONE HYDROCHLORIDE 10 MILLIGRAM(S): 5 TABLET ORAL at 07:52

## 2023-06-18 RX ADMIN — OXYCODONE HYDROCHLORIDE 10 MILLIGRAM(S): 5 TABLET ORAL at 13:26

## 2023-06-18 RX ADMIN — FAMOTIDINE 40 MILLIGRAM(S): 10 INJECTION INTRAVENOUS at 09:41

## 2023-06-18 RX ADMIN — MAGNESIUM OXIDE 400 MG ORAL TABLET 400 MILLIGRAM(S): 241.3 TABLET ORAL at 21:03

## 2023-06-18 RX ADMIN — PANTOPRAZOLE SODIUM 40 MILLIGRAM(S): 20 TABLET, DELAYED RELEASE ORAL at 05:22

## 2023-06-18 RX ADMIN — OXYCODONE HYDROCHLORIDE 10 MILLIGRAM(S): 5 TABLET ORAL at 23:02

## 2023-06-18 RX ADMIN — Medication 1 TABLET(S): at 05:21

## 2023-06-18 RX ADMIN — Medication 325 MILLIGRAM(S): at 09:40

## 2023-06-18 RX ADMIN — OXYCODONE HYDROCHLORIDE 10 MILLIGRAM(S): 5 TABLET ORAL at 14:00

## 2023-06-18 RX ADMIN — OXYCODONE HYDROCHLORIDE 10 MILLIGRAM(S): 5 TABLET ORAL at 03:05

## 2023-06-18 RX ADMIN — OXYCODONE HYDROCHLORIDE 10 MILLIGRAM(S): 5 TABLET ORAL at 23:44

## 2023-06-18 RX ADMIN — CLOPIDOGREL BISULFATE 75 MILLIGRAM(S): 75 TABLET, FILM COATED ORAL at 09:44

## 2023-06-18 RX ADMIN — Medication 25 MILLIGRAM(S): at 09:40

## 2023-06-18 RX ADMIN — OXYCODONE HYDROCHLORIDE 10 MILLIGRAM(S): 5 TABLET ORAL at 08:25

## 2023-06-18 RX ADMIN — LISINOPRIL 5 MILLIGRAM(S): 2.5 TABLET ORAL at 09:40

## 2023-06-18 NOTE — PROGRESS NOTE ADULT - SUBJECTIVE AND OBJECTIVE BOX
Patient is a 72y old  Female who presents with a chief complaint of Chest pain (2023 15:07)    - denies CP or SOB , yesterday had another brief epsiode of chest tightness that only lasted 1 minute    MEDICATIONS  (STANDING):  aspirin enteric coated 325 milliGRAM(s) Oral daily  atorvastatin 40 milliGRAM(s) Oral at bedtime  clopidogrel Tablet 75 milliGRAM(s) Oral daily  lisinopril 5 milliGRAM(s) Oral daily  magnesium oxide 400 milliGRAM(s) Oral two times a day with meals  metoprolol tartrate 25 milliGRAM(s) Oral daily  pantoprazole    Tablet 40 milliGRAM(s) Oral before breakfast  triamterene 37.5 mG/hydrochlorothiazide 25 mG Tablet 1 Tablet(s) Oral daily    MEDICATIONS  (PRN):  famotidine    Tablet 40 milliGRAM(s) Oral daily PRN gerd  nitroglycerin     SubLingual 0.4 milliGRAM(s) SubLingual every 5 minutes PRN Chest Pain  oxyCODONE    IR 10 milliGRAM(s) Oral every 4 hours PRN Severe Pain (7 - 10)            Vital Signs Last 24 Hrs  T(C): 36.6 (2023 19:58), Max: 36.6 (2023 08:49)  T(F): 97.8 (2023 19:58), Max: 97.9 (2023 08:49)  HR: 63 (2023 05:00) (63 - 67)  BP: 136/61 (2023 05:00) (130/48 - 150/60)  BP(mean): 69 (2023 19:58) (69 - 85)  RR: 18 (2023 08:49) (18 - 18)  SpO2: 96% (2023 19:58) (94% - 96%)    Parameters below as of 2023 19:58  Patient On (Oxygen Delivery Method): room air                INTERPRETATION OF TELEMETRY: SR V couplet    ECG:        LABS:                        12.1   9.08  )-----------( 234      ( 2023 15:54 )             36.4     06-17    140  |  106  |  13  ----------------------------<  97  4.0   |  30  |  0.44<L>    Ca    9.2      2023 05:44  Mg     1.4     06-16    TPro  6.7  /  Alb  3.1<L>  /  TBili  0.3  /  DBili  x   /  AST  8<L>  /  ALT  18  /  AlkPhos  57  06-16          Urinalysis Basic - ( 2023 17:04 )    Color: Yellow / Appearance: Clear / S.010 / pH: x  Gluc: x / Ketone: Negative  / Bili: Negative / Urobili: Negative   Blood: x / Protein: Negative / Nitrite: Negative   Leuk Esterase: Negative / RBC: 3-5 /HPF / WBC 3-5 /HPF   Sq Epi: x / Non Sq Epi: x / Bacteria: Few      I&O's Summary    BNP  RADIOLOGY & ADDITIONAL STUDIES:

## 2023-06-18 NOTE — PROGRESS NOTE ADULT - SUBJECTIVE AND OBJECTIVE BOX
HOSPITALIST ATTENDING PROGRESS NOTE    Chart and meds reviewed.  Patient seen and examined.    CC: CP    Subjective: Denies CP, SOB, palpitations.    All other systems reviewed and found to be negative with the exception of what has been described above.    MEDICATIONS  (STANDING):  aspirin enteric coated 325 milliGRAM(s) Oral daily  atorvastatin 40 milliGRAM(s) Oral at bedtime  clopidogrel Tablet 75 milliGRAM(s) Oral daily  lisinopril 5 milliGRAM(s) Oral daily  magnesium oxide 400 milliGRAM(s) Oral two times a day with meals  metoprolol tartrate 25 milliGRAM(s) Oral daily  pantoprazole    Tablet 40 milliGRAM(s) Oral before breakfast  triamterene 37.5 mG/hydrochlorothiazide 25 mG Tablet 1 Tablet(s) Oral daily    MEDICATIONS  (PRN):  famotidine    Tablet 40 milliGRAM(s) Oral daily PRN gerd  nitroglycerin     SubLingual 0.4 milliGRAM(s) SubLingual every 5 minutes PRN Chest Pain  oxyCODONE    IR 10 milliGRAM(s) Oral every 4 hours PRN Severe Pain (7 - 10)      VITALS:  T(F): 98.4 (23 @ 08:37), Max: 98.4 (18- @ 08:37)  HR: 68 (23 @ 08:37) (63 - 68)  BP: 133/69 (18-23 @ 08:37) (130/48 - 136/61)  RR: 18 (18- @ 08:37) (18 - 18)  SpO2: 95% (23 @ 08:37) (95% - 96%)  Wt(kg): --    I&O's Summary      CAPILLARY BLOOD GLUCOSE          PHYSICAL EXAM:  Gen: NAD  HEENT:  pupils equal and reactive, EOMI, no oropharyngeal lesions, erythema, exudates, oral thrush  NECK:   supple, no carotid bruits, no palpable lymph nodes, no thyromegaly  CV:  +S1, +S2, regular, no murmurs or rubs  RESP:   lungs clear to auscultation bilaterally, no wheezing, rales, rhonchi, good air entry bilaterally  BREAST:  not examined  GI:  abdomen soft, non-tender, non-distended, normal BS, no bruits, no abdominal masses, no palpable masses  RECTAL:  not examined  :  not examined  MSK:   normal muscle tone, no atrophy, no rigidity, no contractions  EXT:  no clubbing, no cyanosis, no edema, no calf pain, swelling or erythema  VASCULAR:  pulses equal and symmetric in the upper and lower extremities  NEURO:  AAOX3, no focal neurological deficits, follows all commands, able to move extremities spontaneously  SKIN:  no ulcers, lesions or rashes    LABS:                            12.1   9.08  )-----------( 234      ( 2023 15:54 )             36.4     06-    140  |  106  |  13  ----------------------------<  97  4.0   |  30  |  0.44<L>    Ca    9.2      2023 05:44  Mg     1.4     -    TPro  6.7  /  Alb  3.1<L>  /  TBili  0.3  /  DBili  x   /  AST  8<L>  /  ALT  18  /  AlkPhos  57  -        LIVER FUNCTIONS - ( 2023 15:54 )  Alb: 3.1 g/dL / Pro: 6.7 gm/dL / ALK PHOS: 57 U/L / ALT: 18 U/L / AST: 8 U/L / GGT: x             Urinalysis Basic - ( 2023 17:04 )    Color: Yellow / Appearance: Clear / S.010 / pH: x  Gluc: x / Ketone: Negative  / Bili: Negative / Urobili: Negative   Blood: x / Protein: Negative / Nitrite: Negative   Leuk Esterase: Negative / RBC: 3-5 /HPF / WBC 3-5 /HPF   Sq Epi: x / Non Sq Epi: x / Bacteria: Few    CULTURES:  no new    Additional results/Imaging, I have personally reviewed:  CXR 23: clear    Telemetry, personally reviewed:  23: sinus 60s

## 2023-06-19 ENCOUNTER — TRANSCRIPTION ENCOUNTER (OUTPATIENT)
Age: 73
End: 2023-06-19

## 2023-06-19 VITALS
TEMPERATURE: 98 F | OXYGEN SATURATION: 97 % | RESPIRATION RATE: 16 BRPM | HEART RATE: 65 BPM | SYSTOLIC BLOOD PRESSURE: 114 MMHG | DIASTOLIC BLOOD PRESSURE: 68 MMHG

## 2023-06-19 PROCEDURE — 99239 HOSP IP/OBS DSCHRG MGMT >30: CPT

## 2023-06-19 RX ORDER — ASPIRIN/CALCIUM CARB/MAGNESIUM 324 MG
81 TABLET ORAL DAILY
Refills: 0 | Status: DISCONTINUED | OUTPATIENT
Start: 2023-06-19 | End: 2023-06-19

## 2023-06-19 RX ORDER — ASPIRIN/CALCIUM CARB/MAGNESIUM 324 MG
1 TABLET ORAL
Qty: 30 | Refills: 0
Start: 2023-06-19 | End: 2023-07-18

## 2023-06-19 RX ORDER — METOPROLOL TARTRATE 50 MG
25 TABLET ORAL
Refills: 0 | Status: DISCONTINUED | OUTPATIENT
Start: 2023-06-19 | End: 2023-06-19

## 2023-06-19 RX ORDER — METOPROLOL TARTRATE 50 MG
1 TABLET ORAL
Qty: 60 | Refills: 0
Start: 2023-06-19 | End: 2023-07-18

## 2023-06-19 RX ORDER — ATORVASTATIN CALCIUM 80 MG/1
1 TABLET, FILM COATED ORAL
Qty: 30 | Refills: 0
Start: 2023-06-19 | End: 2023-07-18

## 2023-06-19 RX ORDER — METHYLPREDNISOLONE 4 MG
1 TABLET ORAL
Qty: 30 | Refills: 0
Start: 2023-06-19 | End: 2023-07-18

## 2023-06-19 RX ORDER — METOPROLOL TARTRATE 50 MG
1 TABLET ORAL
Refills: 0 | DISCHARGE

## 2023-06-19 RX ORDER — SODIUM CHLORIDE 9 MG/ML
1000 INJECTION INTRAMUSCULAR; INTRAVENOUS; SUBCUTANEOUS
Refills: 0 | Status: DISCONTINUED | OUTPATIENT
Start: 2023-06-19 | End: 2023-06-19

## 2023-06-19 RX ORDER — SODIUM CHLORIDE 9 MG/ML
250 INJECTION INTRAMUSCULAR; INTRAVENOUS; SUBCUTANEOUS ONCE
Refills: 0 | Status: COMPLETED | OUTPATIENT
Start: 2023-06-19 | End: 2023-06-19

## 2023-06-19 RX ADMIN — SODIUM CHLORIDE 160 MILLILITER(S): 9 INJECTION INTRAMUSCULAR; INTRAVENOUS; SUBCUTANEOUS at 10:04

## 2023-06-19 RX ADMIN — Medication 1 TABLET(S): at 06:00

## 2023-06-19 RX ADMIN — LISINOPRIL 5 MILLIGRAM(S): 2.5 TABLET ORAL at 11:32

## 2023-06-19 RX ADMIN — OXYCODONE HYDROCHLORIDE 10 MILLIGRAM(S): 5 TABLET ORAL at 08:06

## 2023-06-19 RX ADMIN — OXYCODONE HYDROCHLORIDE 10 MILLIGRAM(S): 5 TABLET ORAL at 07:40

## 2023-06-19 RX ADMIN — Medication 25 MILLIGRAM(S): at 11:32

## 2023-06-19 RX ADMIN — OXYCODONE HYDROCHLORIDE 10 MILLIGRAM(S): 5 TABLET ORAL at 12:10

## 2023-06-19 RX ADMIN — PANTOPRAZOLE SODIUM 40 MILLIGRAM(S): 20 TABLET, DELAYED RELEASE ORAL at 06:00

## 2023-06-19 RX ADMIN — SODIUM CHLORIDE 250 MILLILITER(S): 9 INJECTION INTRAMUSCULAR; INTRAVENOUS; SUBCUTANEOUS at 09:00

## 2023-06-19 RX ADMIN — OXYCODONE HYDROCHLORIDE 10 MILLIGRAM(S): 5 TABLET ORAL at 03:09

## 2023-06-19 RX ADMIN — Medication 325 MILLIGRAM(S): at 08:01

## 2023-06-19 RX ADMIN — OXYCODONE HYDROCHLORIDE 10 MILLIGRAM(S): 5 TABLET ORAL at 11:31

## 2023-06-19 RX ADMIN — CLOPIDOGREL BISULFATE 75 MILLIGRAM(S): 75 TABLET, FILM COATED ORAL at 07:41

## 2023-06-19 RX ADMIN — OXYCODONE HYDROCHLORIDE 10 MILLIGRAM(S): 5 TABLET ORAL at 03:45

## 2023-06-19 NOTE — CHART NOTE - NSCHARTNOTEFT_GEN_A_CORE
-Pt is a pleasant 73 y/o female with PMHx of HTN, CAD, Diverticulitis, smoking history who  presented to [facility] ED c/o worsening  chest pain  for the last five days.  She reports pain began under hr left breast several days ago with radiation to the neck.  Pain was 4/10  described  as a  squeezing tightness in her  chest.   At times pain wakes the patient from sleep.   Scheduled for LHC and possible PCI  Consent obtained for cardiac catheterization w/ coronary angiogram and possible stent placemen with possible sedation/analgesia. Pt is competent, has capacity, and understands risks and benefits of procedure. Risks and benefits discussed. Risk discussed included, but not limited to MI, stroke, mortality, major bleeding, arrythmia, or infection. All questions answered   ASA: II  Bleeding: Risk score:1.1%  Creatinine:0.84  GFR:100  Ata score: 1 point

## 2023-06-19 NOTE — PROGRESS NOTE ADULT - SUBJECTIVE AND OBJECTIVE BOX
s/p LHC revealing non obstructive CAD   Denies chest pain, shortness of breath, dizziness or palpitations     PHYSICAL EXAM:  Constitutional: NAD  Neuro: Alert and oriented x 3 Speech clear No focal deficits  Neck: No JVD No bruit  Respiratory: CTAB  Cardiovascular: S1 and S2, RRR,   Gastrointestinal: BS+, soft, NT/ND  Extremities: No clubbing cyanosis or edema No varicosities  Vascular: 2+ peripheral pulses  Psychiatric: Normal mood, normal affect  Musculoskeletal: 5/5 strength b/l upper and lower extremities  Right groin procedure site; no bleeding, no hematoma, site soft, non tender, positive pedal pulses bilaterally    Vital Signs Last 24 Hrs  T(C): 36.6 (19 Jun 2023 08:21), Max: 36.8 (19 Jun 2023 05:30)  T(F): 97.8 (19 Jun 2023 08:21), Max: 98.3 (19 Jun 2023 05:30)  HR: 65 (19 Jun 2023 10:15) (65 - 96)  BP: 116/65 (19 Jun 2023 10:15) (116/65 - 151/77)  RR: 16 (19 Jun 2023 10:15) (16 - 18)  SpO2: 95% (19 Jun 2023 10:15) (95% - 98%)    Parameters below as of 19 Jun 2023 10:15  Patient On (Oxygen Delivery Method): room air        HPI:  Pt is a pleasant 71 y/o female with PMHx of HTN, CAD, Diverticulitis, smoking history who  presented to [facility] ED c/o worsening  chest pain  for the last five days.  She reports pain began under hr left breast several days ago with radiation to the neck.  Pain was 4/10  described as a  squeezing tightness in her  chest.  At times pain wakes the patient from sleep.  She has had radiation to the nicole and yesterday reported diaphoresis  Pain has been at rest and worse with exertion.  It has lasted for 45 min at times.  Pt also notes a sensation of burping with her symptoms.  She has been taking nitroglycerin every day since onset of symptoms.   She has been taking a full ASA daily. Pt has chronic mild dyspnea.  No fever/chills/URI / UTI  or abd symptoms.   Pt had a routine stress test 12/2022 and echo which was reportedly negative.    (16 Jun 2023 23:59)    s/p LHC revealing non obstructive CAD  -ambulate ad alanis post bedrest  -encourage PO fluids  -plan of care discussed with patient and MD  -d/c after bedrest if stable  -post procedure and d/c instructions reviewed  -follow up with MD in 1-2 weeks after discharge  -Discussed therapeutic lifestyle changes to reduce risk factors such as following a cardiac diet, weight loss, maintaining a healthy weight, exercise, smoking cessation, medication compliance, and regular follow-up  with MD

## 2023-06-19 NOTE — DISCHARGE NOTE PROVIDER - HOSPITAL COURSE
CC: MIKE  HPI and Hospital Course: 71 y/o female with PMHx of HTN, CAD, Diverticulitis, smoking history who  presented to [facility] ED c/o worsening  chest pain for 5d, now resolved.     #CP, hx CAD  -tele  -trops neg, EKG at b/l  -A1c 5.8  -LDL 60  -no echo needed per cards  - + plavix per cards prior to cath  -cath with normal coronaries  -ASA 81, statin, BB  -outpt cards f/u    #HTN  -home meds    #hypomag  -replete    #DVT ppx- SCDs    I have spent 36 min on day of d/c coordinating care.

## 2023-06-19 NOTE — DISCHARGE NOTE PROVIDER - PROVIDER TOKENS
PROVIDER:[TOKEN:[[id number]],FOLLOWUP:[1 week]],PROVIDER:[TOKEN:[[id number]],FOLLOWUP:[2 weeks]]

## 2023-06-19 NOTE — DISCHARGE NOTE PROVIDER - NSDCMRMEDTOKEN_GEN_ALL_CORE_FT
aspirin 81 mg oral delayed release tablet: 1 tab(s) orally once a day  atorvastatin 40 mg oral tablet: 1 tab(s) orally once a day (at bedtime)  Dyazide 37.5 mg-25 mg oral capsule: 1 cap(s) orally once a day (in the morning)  famotidine 40 mg oral tablet: 1 tab(s) orally once a day as needed for  lisinopril 5 mg oral tablet: 1 tab(s) orally once a day  magnesium gluconate 500 mg oral tablet: 1 tab(s) orally once a day  metoprolol tartrate 25 mg oral tablet: 1 tab(s) orally 2 times a day  oxycodone-acetaminophen 10 mg-325 mg oral tablet: 1 tab(s) orally 6 times a day as needed for  Premarin 0.625 mg oral tablet: 1 tab(s) orally once a day (in the morning)

## 2023-06-19 NOTE — DISCHARGE NOTE PROVIDER - CARE PROVIDER_API CALL
Dominik Frazier.  Internal Medicine  [address] [place] [address]  Phone: [phone]  Fax: [phone]  Follow Up Time: 1 week    Khadar Duke  Cardiovascular Disease  [address] [place] [address]  Phone: [phone]  Fax: [phone]  Follow Up Time: 2 weeks   14:30

## 2023-06-19 NOTE — PROGRESS NOTE ADULT - SUBJECTIVE AND OBJECTIVE BOX
CHIEF COMPLAINT: Patient is a 72y old  Female who presents with a chief complaint of Chest pain (19 Jun 2023 10:13)    FROM H&P: Pt is a pleasant 71 y/o female with PMHx of HTN, CAD, Diverticulitis, smoing history who  presented to [place] ED c/o worsening  chest pain  for the last five days.  She reports pain began under hr left breast several days ago with radiation to the neck.  Pain was 4/10  decribed as a  squeezing tightness in her  chest.   At times pain wakes the patient from sleep.  She has had radiation to the nicole and yesterday reported diaphorosis.  Pain has been at rest and worse with exertion.  It has lasted for 45 min at times.  Pt also notes a sensation of burping with her symptoms.  She has been taking nitroglycerin every day since onset of symptoms.   She has been taking a full ASA daily.     Pt has chronic mild dyspnea.  No fever/chills/URI / UTI  or abd symptoms.  Pt had a routine stress test 12/2022 and echo which was reportedly negative.  (16 Jun 2023 23:59)        PAST MEDICAL & SURGICAL HISTORY:  HTN (hypertension)  Diverticulitis  Headache  Recurrent ventral hernia  Bilateral recurrent inguinal hernia without obstruction or gangrene  Yeast infection  Uterine leiomyoma, unspecified location, History of  Cysts of both ovaries  Depression  Osteopenia  Pain, hand and wrist      Tear of right rotator cuff, unspecified tear extent      Spondylosis      Herniated disc, cervical  and lumbar      Spondylosis      Hematuria      Gastroesophageal reflux disease without esophagitis      Irritable bowel syndrome with diarrhea      Hemorrhoids      Gall bladder disease      H/O microdiscectomy  x 2 , 2004      History of bowel resection  with ileostomy      H/O bilateral oophorectomy      H/O abdominal hysterectomy      S/P ORIF (open reduction internal fixation) fracture  right wrist      History of cholecystectomy      H/O ventral hernia repair      PREVIOUS DIAGNOSTIC TESTING:      ECHO: FINDINGS:    STRESS: FINDINGS:    CATHETERIZATION: FINDINGS:    MEDICATIONS  (STANDING):  aspirin enteric coated 325 milliGRAM(s) Oral daily  atorvastatin 40 milliGRAM(s) Oral at bedtime  clopidogrel Tablet 75 milliGRAM(s) Oral daily  lisinopril 5 milliGRAM(s) Oral daily  magnesium oxide 400 milliGRAM(s) Oral two times a day with meals  metoprolol tartrate 25 milliGRAM(s) Oral daily  pantoprazole    Tablet 40 milliGRAM(s) Oral before breakfast  sodium chloride 0.9%. 1000 milliLiter(s) (160 mL/Hr) IV Continuous <Continuous>  triamterene 37.5 mG/hydrochlorothiazide 25 mG Tablet 1 Tablet(s) Oral daily    MEDICATIONS  (PRN):  famotidine    Tablet 40 milliGRAM(s) Oral daily PRN gerd  nitroglycerin     SubLingual 0.4 milliGRAM(s) SubLingual every 5 minutes PRN Chest Pain  oxyCODONE    IR 10 milliGRAM(s) Oral every 4 hours PRN Severe Pain (7 - 10)          PHYSICAL EXAM:  Vital Signs Last 24 Hrs  T(C): 36.6 (19 Jun 2023 08:21), Max: 36.8 (19 Jun 2023 05:30)  T(F): 97.8 (19 Jun 2023 08:21), Max: 98.3 (19 Jun 2023 05:30)  HR: 64 (19 Jun 2023 11:00) (64 - 96)  BP: 139/66 (19 Jun 2023 11:00) (116/65 - 151/77)  BP(mean): --  RR: 16 (19 Jun 2023 11:00) (16 - 18)  SpO2: 95% (19 Jun 2023 11:00) (95% - 98%)    Parameters below as of 19 Jun 2023 11:00  Patient On (Oxygen Delivery Method): room air        Constitutional: NAD, awake and alert  HEENT: PERR, EOMI, Normal Hearing, MMM  Neck: Soft and supple, No LAD, No JVD  Respiratory: Breath sounds are clear bilaterally, No wheezing, rales or rhonchi  Cardiovascular: S1 and S2, regular rate and rhythm, no Murmurs, gallops or rubs  Gastrointestinal: Bowel Sounds present, soft, nontender, nondistended, no guarding, no rebound  Extremities: No peripheral edema  Vascular: 2+ peripheral pulses  Neurological: A/O x 3, no focal deficits  Musculoskeletal: 5/5 strength b/l upper and lower extremities  Skin: No rashes    =======================================    INTERPRETATION OF TELEMETRY:    ECG:    ========================================    LABS:                  I&O's Summary    19 Jun 2023 07:01  -  19 Jun 2023 11:26  --------------------------------------------------------  IN: 570 mL / OUT: 0 mL / NET: 570 mL      BNP      RADIOLOGY & ADDITIONAL STUDIES: CHIEF COMPLAINT: Patient is a 72y old  Female who presents with a chief complaint of Chest pain (19 Jun 2023 10:13)    FROM H&P: Pt is a pleasant 73 y/o female with PMHx of HTN, CAD, Diverticulitis, smoing history who  presented to [place] ED c/o worsening  chest pain  for the last five days.  She reports pain began under hr left breast several days ago with radiation to the neck.  Pain was 4/10  decribed as a  squeezing tightness in her  chest.   At times pain wakes the patient from sleep.  She has had radiation to the nicole and yesterday reported diaphorosis.  Pain has been at rest and worse with exertion.  It has lasted for 45 min at times.  Pt also notes a sensation of burping with her symptoms.  She has been taking nitroglycerin every day since onset of symptoms.   She has been taking a full ASA daily.     Pt has chronic mild dyspnea.  No fever/chills/URI / UTI  or abd symptoms.  Pt had a routine stress test 12/2022 and echo which was reportedly negative.  (16 Jun 2023 23:59)    6/17- she had a cardiac PET in December of 2022 that was negative for ischemia   Pt had a routine stress test 12/2022 and echo which was reportedly negative.   she had another episode this AM after her morning ekg that only lasted 1 minute and resolved   EKG shows no change from her baseline   6/18- denies CP or SOB , yesterday had another brief epsiode of chest tightness that only lasted 1 minute  6/19. patient seen this AM in cath lab  feeling ok. s/p cath, normal coronaries    PAST MEDICAL & SURGICAL HISTORY:  HTN (hypertension)  Diverticulitis  Headache  Recurrent ventral hernia  Bilateral recurrent inguinal hernia without obstruction or gangrene  Yeast infection  Uterine leiomyoma, unspecified location, History of  Cysts of both ovaries  Depression  Osteopenia  Pain, hand and wrist  Tear of right rotator cuff, unspecified tear extent  Spondylosis  Herniated disc, cervicaland lumbar  Hematuria  Gastroesophageal reflux disease without esophagitis  Irritable bowel syndrome with diarrhea  Hemorrhoids  Gall bladder disease  H/O microdiscectomy x 2 , 2004  History of bowel resection with ileostomy  H/O bilateral oophorectomy  H/O abdominal hysterectomy  S/P ORIF (open reduction internal fixation) fracture right wrist  History of cholecystectomy  H/O ventral hernia repair    MEDICATIONS:  aspirin enteric coated 325 milliGRAM(s) Oral daily  atorvastatin 40 milliGRAM(s) Oral at bedtime  clopidogrel Tablet 75 milliGRAM(s) Oral daily  lisinopril 5 milliGRAM(s) Oral daily  magnesium oxide 400 milliGRAM(s) Oral two times a day with meals  metoprolol tartrate 25 milliGRAM(s) Oral daily  pantoprazole    Tablet 40 milliGRAM(s) Oral before breakfast  sodium chloride 0.9%. 1000 milliLiter(s) (160 mL/Hr) IV Continuous <Continuous>  triamterene 37.5 mG/hydrochlorothiazide 25 mG Tablet 1 Tablet(s) Oral daily    MEDICATIONS  (PRN):  famotidine    Tablet 40 milliGRAM(s) Oral daily PRN gerd  nitroglycerin     SubLingual 0.4 milliGRAM(s) SubLingual every 5 minutes PRN Chest Pain  oxyCODONE    IR 10 milliGRAM(s) Oral every 4 hours PRN Severe Pain (7 - 10)    HOME MEDICATIONS:  Dyazide 37.5 mg-25 mg oral capsule: 1 cap(s) orally once a day (in the morning) (16 Jun 2023 21:29)  famotidine 40 mg oral tablet: 1 tab(s) orally once a day as needed for (16 Jun 2023 21:29)  lisinopril 5 mg oral tablet: 1 tab(s) orally once a day (16 Jun 2023 21:29)  metoprolol tartrate 25 mg oral tablet: 1 tab(s) orally once a day (16 Jun 2023 21:29)  oxycodone-acetaminophen 10 mg-325 mg oral tablet: 1 tab(s) orally 6 times a day as needed for (16 Jun 2023 21:32)  Premarin 0.625 mg oral tablet: 1 tab(s) orally once a day (in the morning) (16 Jun 2023 21:29)    PHYSICAL EXAM:  T(C): 36.6 (19 Jun 2023 08:21), Max: 36.8 (19 Jun 2023 05:30)  T(F): 97.8 (19 Jun 2023 08:21), Max: 98.3 (19 Jun 2023 05:30)  HR: 64 (19 Jun 2023 11:00) (64 - 96)  BP: 139/66 (19 Jun 2023 11:00) (116/65 - 151/77)  RR: 16 (19 Jun 2023 11:00) (16 - 18)  SpO2: 95% (19 Jun 2023 11:00) (95% - 98%)    Parameters below as of 19 Jun 2023 11:00  Patient On (Oxygen Delivery Method): room air    Constitutional: NAD, awake and alert  HEENT: PERR, EOMI, Normal Hearing, MMM  Neck: Soft and supple, No LAD, No JVD  Respiratory: Breath sounds are clear bilaterally, No wheezing, rales or rhonchi  Cardiovascular: S1 and S2, regular rate and rhythm, no Murmurs, gallops or rubs  Gastrointestinal: Bowel Sounds present, soft, nontender, nondistended, no guarding, no rebound  Extremities: No peripheral edema  Vascular: 2+ peripheral pulses  Neurological: A/O x 3, no focal deficits  Musculoskeletal: 5/5 strength b/l upper and lower extremities  Skin: No rashes    =======================================    INTERPRETATION OF TELEMETRY: NSR    ECG: < from: 12 Lead ECG (06.17.23 @ 08:05) >  Ventricular Rate 64 BPM    Atrial Rate 64 BPM    P-R Interval 258 ms    QRS Duration 88 ms    Q-T Interval 412 ms    QTC Calculation(Bazett) 425 ms    P Axis 85 degrees    R Axis 84 degrees    T Axis 68 degrees    Diagnosis Line Sinus rhythm with 1st degree A-V block  T wave abnormality, consider anterior ischemia  Abnormal ECG  Confirmed by ELAINE MICHELLE MD ([id number]) on 6/17/2023 11:27:28 AM    < end of copied text >      ========================================    RADIOLOGY & ADDITIONAL STUDIES:

## 2023-06-19 NOTE — PROGRESS NOTE ADULT - ASSESSMENT
71 y/o female with PMHx of HTN, CAD, Diverticulitis, smoking history who  presented to [place] ED c/o worsening  chest pain for 5d, now resolved.     #CP, hx CAD  -tele  -trops neg, EKG at b/l  -A1c pending  -LDL 60  -no echo needed per cards  - + plavix per cards  -BB, statin  -PPI  -for cath Monday  -add heparin drip if develops CP/equivalent sx    #HTN  -home meds    #DVT ppx- SCDs    Pending Cath Monday  
73 y/o female with PMHx of HTN, CAD, Diverticulitis, smoking history who  presented to [place] ED c/o worsening  chest pain for 5d, now resolved.     #CP, hx CAD  -tele  -trops neg, EKG at b/l  -A1c 5.8  -LDL 60  -no echo needed per cards  - + plavix per cards  -BB, statin  -PPI  -for cath Monday  -add heparin drip if develops CP/equivalent sx    #HTN  -home meds    #DVT ppx- SCDs    Pending Cath Monday    
Pt is a pleasant 73 y/o female with PMHx of HTN, CAD, Diverticulitis, smoking history who  presented to [place] ED c/o worsening  chest pain  for the last five days.  She reports pain began under hr left breast several days ago with radiation to the neck.  Pain was 4/10  decribed as a  squeezing tightness in her  chest.   At times pain wakes the patient from sleep.  She has had radiation to the jaw and yesterday reported diaphorosis.  Pain has been at rest and worse with exertion.  It has lasted for 45 min at times.  Pt also notes a sensation of burping with her symptoms.  She has been taking nitroglycerin every day since onset of symptoms.   She has been taking a full ASA daily.     Pt has chronic mild dyspnea.  No fever/chills/URI / UTI  or abd symptoms.   6/17- she had a cardiac PET in December of 2022 that was negative for ischemia   Pt had a routine stress test 12/2022 and echo which was reportedly negative.   she had another episode this AM after her morning ekg that only lasted 1 minute and resolved   EKG shows no change from her baseline   1) will cont bblockers asa plavix , if more chest pain would start IV hep for ACS   2) cath Monday with Dr Baron   3) cont statins 
73 y/o female with PMHx of HTN, CAD, Diverticulitis, smoking history who  presented to [place] ED c/o worsening  chest pain  for the last five days.  She reports pain began under hr left breast several days ago with radiation to the neck.  Pain was 4/10  decribed as a  squeezing tightness in her  chest.   At times pain wakes the patient from sleep.  She has had radiation to the jaw and yesterday reported diaphorosis.  Pain has been at rest and worse with exertion.  It has lasted for 45 min at times.  Pt also notes a sensation of burping with her symptoms.  She has been taking nitroglycerin every day since onset of symptoms.   She has been taking a full ASA daily.     Pt has chronic mild dyspnea.  No fever/chills/URI / UTI  or abd symptoms.     #CP. S/p cardiac angiogram on 6/19, normal coronaries  - No events on tele. Trops neg, EKG at b/l  - A1c 5.8, LDL 60  -12/2022: LVEF wnl, no significant valvulopathy (outpatient)  - C/w aspirin 81mg daily, statin, BB    #HTN. Continue home meds    Case d/w Dr. Baron  will sign off, ok for discharge from cardiac perspective.     6/30/23 @ 2:30PM. Appointment made follow up.

## 2023-06-19 NOTE — DISCHARGE NOTE PROVIDER - NSDCCPCAREPLAN_GEN_ALL_CORE_FT
PRINCIPAL DISCHARGE DIAGNOSIS  Diagnosis: Chest pain  Assessment and Plan of Treatment: Your cardiac catheterization showed normal coronaries. Take baby aspirin, atorvastatin and metoprolol. Follow up with cardiology.

## 2023-06-19 NOTE — DISCHARGE NOTE PROVIDER - NSDCPNSUBOBJ_GEN_ALL_CORE
VITALS:  T(F): 97.4 (06-19-23 @ 11:28), Max: 98.3 (06-19-23 @ 05:30)  HR: 74 (06-19-23 @ 11:28) (64 - 96)  BP: 154/66 (06-19-23 @ 11:28) (116/65 - 154/66)  RR: 16 (06-19-23 @ 11:28) (16 - 18)  SpO2: 100% (06-19-23 @ 11:28) (95% - 100%)    PHYSICAL EXAM:  Gen: NAD  HEENT:  pupils equal and reactive, EOMI, no oropharyngeal lesions, erythema, exudates, oral thrush  NECK:   supple, no carotid bruits, no palpable lymph nodes, no thyromegaly  CV:  +S1, +S2, regular, no murmurs or rubs  RESP:   lungs clear to auscultation bilaterally, no wheezing, rales, rhonchi, good air entry bilaterally  BREAST:  not examined  GI:  abdomen soft, non-tender, non-distended, normal BS, no bruits, no abdominal masses, no palpable masses  RECTAL:  not examined  :  not examined  MSK:   normal muscle tone, no atrophy, no rigidity, no contractions  EXT:  no clubbing, no cyanosis, no edema, no calf pain, swelling or erythema  VASCULAR:  pulses equal and symmetric in the upper and lower extremities  NEURO:  AAOX3, no focal neurological deficits, follows all commands, able to move extremities spontaneously  SKIN:  no ulcers, lesions or rashes    CXR 6/16/23: clear    LHC 6/19/23:  Normal coronary arteries.

## 2023-06-19 NOTE — DISCHARGE NOTE NURSING/CASE MANAGEMENT/SOCIAL WORK - PATIENT PORTAL LINK FT
You can access the FollowMyHealth Patient Portal offered by [facility] by registering at the following website: [email or web address]. By joining [facility]’s FollowMyHealth portal, you will also be able to view your health information using other applications (apps) compatible with our system.

## 2023-06-19 NOTE — PACU DISCHARGE NOTE - COMMENTS
report given to Evie greenfield, Pt is alert and oriented x 3, vital signs stable with monitor pattern in NSR.  Right groin site soft, no bleeding or hematoma noted, new IV site  placed with  fluids @ 160 ml/hr site clear.  tele monitor intact Pt left via stretcher without any complaints

## 2023-06-19 NOTE — PROGRESS NOTE ADULT - NS ATTEND OPT1 GEN_ALL_CORE
Pulmonary Consultation    Admit Date: 3/21/2023  Requesting Physician: No primary care provider on file. CC: Loculated pleural mass    HPI:  Raquel Lopez 55 y.o. male, lifelong non-smoker, with past medical history type 2 diabetes, BARBIE diagnosed 13 years ago with CPAP of 14 cm H2O who is noncompliant and L4-S1 laminectomy in 08/2022 who originally presented to [facility] ER and ER in [place] where he was told that he had T11 and T12 herniated disc, he was started on prednisone and NSAIDs. He was seen by neurosurgery, Dr. Giles Baum PA-C on 3/17 on 3/21 who ordered outpatient MRI for 3/23 but he presented to the ED on 3/21 with increased pain. ED course: Sodium 125, potassium 5.0, creat 1.5 BUN 45, serum glucose 788, WBC 22.4, sed rate 33, blood culture gram-positive cocci in clusters (MSSA by PCR) and 1 out of 2 sets. Chest x-ray new right pleural effusion with adjacent atelectasis  Patient was started on IV Rocephin x1 followed by azithromycin and vancomycin. ID following    3/23 CT chest loculated moderate right pleural effusion with atelectatic changes of the largely atelectatic right middle and right lower lobe. Scattered gas densities in the right lower lobe pleural portion could represent components of gas within the pleural space with concern of developing necrosis  Pulmonary was consulted for further management of loculated pleural mass    Patient seen on room air, wife at bedside. He admits to having shortness of breath at rest and with exertion, denies cough and wheezing. He admits to having chills last week but no fever documented. No recent travel or sick contacts. Patient denies any previous lung disease, has never followed with pulmonary and is on no daily inhalers.       PMH:    T11 and T12 compression fracture  Past Medical History:   Diagnosis Date    Diabetes mellitus ([address]      PSH:    Past surgical history noted below confirmed by patient  Past Surgical 32   AST 7 28   PROT 6.3* 5.7*   BILITOT 0.3 0.3   LABALBU 2.9* 2.7*       PT/INR: No results for input(s): PROTIME, INR in the last 72 hours. Cultures:          ABG:   No results for input(s): PH, PO2, PCO2, HCO3, BE, O2SAT in the last 72 hours. Films:  CT ABDOMEN PELVIS WO CONTRAST Additional Contrast? None    Result Date: 3/21/2023  EXAMINATION: CT OF THE ABDOMEN AND PELVIS WITHOUT CONTRAST 3/21/2023 8:54 am TECHNIQUE: CT of the abdomen and pelvis was performed without the administration of intravenous contrast. Multiplanar reformatted images are provided for review. Automated exposure control, iterative reconstruction, and/or weight based adjustment of the mA/kV was utilized to reduce the radiation dose to as low as reasonably achievable. COMPARISON: None. HISTORY: ORDERING SYSTEM PROVIDED HISTORY: pain TECHNOLOGIST PROVIDED HISTORY: Additional Contrast?->None Reason for exam:->pain Decision Support Exception - unselect if not a suspected or confirmed emergency medical condition->Emergency Medical Condition (MA) What reading provider will be dictating this exam?->CRC FINDINGS: Mild gynecomastia. Small fat filled umbilical hernia. Fat filled right inguinal hernia. Small right pleural effusion with associated atelectasis or pneumonia in the right lung base. Small foci of gas in the right pleural space may represent infection. No pneumoperitoneum. Evaluation for neoplasm, infection, focal lesions, and trauma is limited without the use of IV contrast.  Suspect fatty infiltration of the liver. Gallbladder negative. Spleen mildly enlarged 14 cm craniocaudal.  Pancreas and adrenal glands negative. No hydronephrosis. Bowel negative for obstruction or inflammation. Large colonic stool burden. Normal appendix. Atherosclerotic nondilated aorta. No suspicious lymphadenopathy or significant ascites. Macro bladder Bones negative. Small right pleural effusion with atelectasis/pneumonia in the right lung base. I attest my time as attending is greater than 50% of the total combined time spent on qualifying patient care activities by the PA/NP and attending.

## 2023-06-22 DIAGNOSIS — Z90.710 ACQUIRED ABSENCE OF BOTH CERVIX AND UTERUS: ICD-10-CM

## 2023-06-22 DIAGNOSIS — I10 ESSENTIAL (PRIMARY) HYPERTENSION: ICD-10-CM

## 2023-06-22 DIAGNOSIS — I25.10 ATHEROSCLEROTIC HEART DISEASE OF NATIVE CORONARY ARTERY WITHOUT ANGINA PECTORIS: ICD-10-CM

## 2023-06-22 DIAGNOSIS — Z87.891 PERSONAL HISTORY OF NICOTINE DEPENDENCE: ICD-10-CM

## 2023-06-22 DIAGNOSIS — R07.9 CHEST PAIN, UNSPECIFIED: ICD-10-CM

## 2023-06-22 DIAGNOSIS — E83.42 HYPOMAGNESEMIA: ICD-10-CM

## 2023-07-12 ENCOUNTER — APPOINTMENT (OUTPATIENT)
Dept: ULTRASOUND IMAGING | Facility: CLINIC | Age: 73
End: 2023-07-12
Payer: MEDICARE

## 2023-07-12 ENCOUNTER — OUTPATIENT (OUTPATIENT)
Dept: OUTPATIENT SERVICES | Facility: HOSPITAL | Age: 73
LOS: 1 days | End: 2023-07-12
Payer: MEDICARE

## 2023-07-12 DIAGNOSIS — Z98.890 OTHER SPECIFIED POSTPROCEDURAL STATES: Chronic | ICD-10-CM

## 2023-07-12 DIAGNOSIS — Z00.8 ENCOUNTER FOR OTHER GENERAL EXAMINATION: ICD-10-CM

## 2023-07-12 DIAGNOSIS — Z96.7 PRESENCE OF OTHER BONE AND TENDON IMPLANTS: Chronic | ICD-10-CM

## 2023-07-12 PROCEDURE — 93971 EXTREMITY STUDY: CPT | Mod: 26,LT

## 2023-07-12 PROCEDURE — 93971 EXTREMITY STUDY: CPT

## 2023-12-11 ENCOUNTER — OUTPATIENT (OUTPATIENT)
Dept: OUTPATIENT SERVICES | Facility: HOSPITAL | Age: 73
LOS: 1 days | End: 2023-12-11
Payer: MEDICARE

## 2023-12-11 ENCOUNTER — APPOINTMENT (OUTPATIENT)
Dept: CT IMAGING | Facility: CLINIC | Age: 73
End: 2023-12-11
Payer: MEDICARE

## 2023-12-11 DIAGNOSIS — Z96.7 PRESENCE OF OTHER BONE AND TENDON IMPLANTS: Chronic | ICD-10-CM

## 2023-12-11 DIAGNOSIS — Z98.890 OTHER SPECIFIED POSTPROCEDURAL STATES: Chronic | ICD-10-CM

## 2023-12-11 DIAGNOSIS — Z00.8 ENCOUNTER FOR OTHER GENERAL EXAMINATION: ICD-10-CM

## 2023-12-11 PROCEDURE — 71250 CT THORAX DX C-: CPT

## 2023-12-11 PROCEDURE — 74177 CT ABD & PELVIS W/CONTRAST: CPT | Mod: 26

## 2023-12-11 PROCEDURE — 71250 CT THORAX DX C-: CPT | Mod: 26

## 2023-12-11 PROCEDURE — 74177 CT ABD & PELVIS W/CONTRAST: CPT

## 2023-12-18 ENCOUNTER — OUTPATIENT (OUTPATIENT)
Dept: OUTPATIENT SERVICES | Facility: HOSPITAL | Age: 73
LOS: 1 days | End: 2023-12-18
Payer: MEDICARE

## 2023-12-18 ENCOUNTER — APPOINTMENT (OUTPATIENT)
Dept: ULTRASOUND IMAGING | Facility: CLINIC | Age: 73
End: 2023-12-18
Payer: MEDICARE

## 2023-12-18 ENCOUNTER — APPOINTMENT (OUTPATIENT)
Dept: MAMMOGRAPHY | Facility: CLINIC | Age: 73
End: 2023-12-18
Payer: MEDICARE

## 2023-12-18 ENCOUNTER — APPOINTMENT (OUTPATIENT)
Dept: RADIOLOGY | Facility: CLINIC | Age: 73
End: 2023-12-18
Payer: MEDICARE

## 2023-12-18 DIAGNOSIS — Z98.890 OTHER SPECIFIED POSTPROCEDURAL STATES: Chronic | ICD-10-CM

## 2023-12-18 DIAGNOSIS — Z96.7 PRESENCE OF OTHER BONE AND TENDON IMPLANTS: Chronic | ICD-10-CM

## 2023-12-18 DIAGNOSIS — Z00.8 ENCOUNTER FOR OTHER GENERAL EXAMINATION: ICD-10-CM

## 2023-12-18 PROCEDURE — 77067 SCR MAMMO BI INCL CAD: CPT | Mod: 26

## 2023-12-18 PROCEDURE — 76641 ULTRASOUND BREAST COMPLETE: CPT

## 2023-12-18 PROCEDURE — 77063 BREAST TOMOSYNTHESIS BI: CPT | Mod: 26

## 2023-12-18 PROCEDURE — 77080 DXA BONE DENSITY AXIAL: CPT | Mod: 26

## 2023-12-18 PROCEDURE — 77063 BREAST TOMOSYNTHESIS BI: CPT

## 2023-12-18 PROCEDURE — 76641 ULTRASOUND BREAST COMPLETE: CPT | Mod: 26,50

## 2023-12-18 PROCEDURE — 77080 DXA BONE DENSITY AXIAL: CPT

## 2023-12-18 PROCEDURE — 77067 SCR MAMMO BI INCL CAD: CPT

## 2024-01-06 NOTE — H&P PST ADULT - HEMATOLOGY/LYMPHATICS
No previous uterine incision/[place] Pregnancy/Less than or equal to 4 previous vaginal births/No known bleeding disorder/No history of postpartum hemorrhage/No other PPH risks indicated
negative

## 2024-02-05 ENCOUNTER — APPOINTMENT (OUTPATIENT)
Dept: DERMATOLOGY | Facility: CLINIC | Age: 74
End: 2024-02-05
Payer: MEDICARE

## 2024-02-05 PROCEDURE — 99203 OFFICE O/P NEW LOW 30 MIN: CPT

## 2024-02-20 NOTE — ED ADULT NURSE NOTE - NS ED NURSE LEVEL OF CONSCIOUSNESS ORIENTATION
fingers/toes warm to touch/no paresthesia/no swelling/no cyanosis of extremity/capillary refill time < 2 seconds
Oriented - self; Oriented - place; Oriented - time

## 2024-04-04 NOTE — ED PROVIDER NOTE - WR INTERPRETED BY 1
PLAN:  Follow up as directed. Additional Educational Resources: For additional resources regarding your symptoms, diagnosis, or further health information, please visit the [facility] section in [address] [place] [address]. Brissa Gray

## 2024-09-25 NOTE — ED STATDOCS - DR. NAME
September 25, 2024       Jennifer Hodgson DO  [address]  [address] [facility] [address]  Via In Basket      Patient: Yulia Sy   YOB: 2005   Date of Visit: 9/25/2024       Dear Dr. Hodgson:    I saw your patient, Yulia Sy, for an evaluation. Below are my notes for this visit with her.    If you have questions, please do not hesitate to call me.      Sincerely,        Blaine Cuenca MD        CC: No Recipients    Blaine Cuenca MD  9/25/2024  4:47 PM  Signed  Pediatric Cardiology Visit    Referring Physician:  Jennifer Hodgson DO      SUBJECTIVE  HPI:  It was my pleasure to see Yulia in outpatient cardiology clinic today, accompanied her mom and younger brother, for evaluation of an abnormal ECG. She was seen in the ED on 2/24/24: \"18 year old with PMHx depression and weight loss currently being worked up by PCP presenting to the emergency department with epigastric pain, paleness, fatigue, and low appetite since this morning. She rates the pain as a 9.5/10 at the worst, but it fluctuates throughout the day, though never disappears. It improves when she bends over. She denies any nausea, vomiting, or diarrhea. She occasionally feels dizzy and weak. Appetite is decreased, today she had water and soup. Mom reports that she looks pale, though this is not new since this morning. Of note, her PCP is working her up for weight loss, paleness, and fatigue over the last year. She has labs ordered but has not yet gotten them drawn.\" UA showed s.g.>1.030. \"Pain resolved after analgesics given. Discharged with instructions to continue pain medications and antacids as needed, and provided with anticipatory guidance.\"    On 2/24/24, her ECG showed sinus rhythm, biatrial enlargement, and global T wave flattening.    She saw her PCP for her yearly well check and disclosed that she had symptoms once during cheerleading. Labs ordered and still not  completed.    Yulia's mom called for same day cancellations to cardiology clinic on 5/21/24 and 7/29/24 after an abnormal ECG was found on 2/24/24. She has no symptoms since. I contacted her PCP on 8/4/24 to request that an echo-only appointment be made, and if the study is normal, then no other cardiology follow-up is indicated. I left a message with her PCP regarding this and also discussed this with Yulia's mother.     She has no cardiovascular symptoms and enjoyed normal growth, development, and activity. She has normal energy and stamina for age and has no trouble keeping up with her cheer teammates. She eats and drinks well, tends to avoid caffeine, has light menses, and usually gets restful sleep.  Her past medical history is otherwise non-contributory, and her review of systems is otherwise negative. She takes no routine medications. Family history is negative for congenital cardiac disease or unexplained premature sudden death.      Review of Systems   Constitutional:  Negative for activity change, appetite change, fatigue and fever.   HENT:  Negative for congestion and sore throat.         No cyanosis   Eyes:  Negative for redness.   Respiratory:  Negative for apnea, chest tightness, shortness of breath and wheezing.    Cardiovascular:  Negative for chest pain and palpitations.   Gastrointestinal:  Negative for abdominal pain.   Endocrine: Negative for cold intolerance and heat intolerance.   Genitourinary:         Reasonable urine output   Musculoskeletal:  Negative for gait problem.        Symmetric movement   Skin:  Negative for color change and pallor.   Allergic/Immunologic:        No known drug allergies   Neurological:  Positive for dizziness and light-headedness. Negative for syncope and weakness.   Hematological:  Does not bruise/bleed easily.   Psychiatric/Behavioral:  Negative for behavioral problems.    All other systems reviewed and are negative.        OBJECTIVE  Constitutional: Well  developed and nourished, in no acute distress and interactive. Happy and engaging young woman with a normal (athletic) body habitus.     Head and Face: Normocephalic and atraumatic.      Eyes: Normal conjunctiva.      ENT: Normal appearing outer ear and normal appearing nose. No nasal discharge. Normal lips. Oral mucosa pink and no cyanosis.    Neck: Supple neck. There was no jugular-venous distention.     Chest: No thoracic asymmetry, no bulging precordium and no chest deformity.     Pulmonary: No respiratory distress, normal respiratory rate and effort and no accessory muscle use. Breath sounds clear to auscultation bilaterally.     Cardiovascular: The apical impulse was normal. no thrill palpable. The heart rate was normal. The rhythm was regular. Normal S1 and S2, no gallop, no S3, no S4, no click, no pericardial rub. No murmur. Normal pulses and perfusion. Lower Extremities: no edema present.     Abdomen: Soft, nontender and nondistended. No hepatomegaly.     Lymphatic: No cervical lymphadenopathy.     Musculoskeletal: Symmetric movement of extremities. Muscle strength and tone were grossly normal.    Neurologic: Cranial nerves grossly intact. No coordination deficits observed and developmentally appropriate for age. Alert.    Skin: No central cyanosis and no peripheral cyanosis. Normal skin turgor.         Relevant Testing:  Her echocardiogram on 9/25/24 showed:  Normal ventricular systolic performance and dimensions.   No atrial enlargement.  No shunt was identified.   No outflow tract obstruction.   Study negative for coarctation of the aorta.   Study negative for pulmonary artery hypertension.      Assessment:  Abnormal ECG      Plan:  The above shows that Yulia's overall cardiac status is excellent and that her abnormal ECG is an isolated finding and not related to structural cardiac disease. The few brief of episodes of chest discomfort described after cheer (last event in April/May 2024) is likely  from a musculoskeletal origin, and I recommended using stretching exercises and heat as needed.  She does not require cardiac medication for this, and there is no indication for SBE prophylaxis. There should be no restriction on her activity from a cardiac standpoint. Her most important cardiac issues involve leading a healthy lifestyle in the future, eating plenty of fruits and vegetables, whole grain foods, minimal junk food, and exercising regularly. No cardiac contraindication for sedation or anesthesia.     No further cardiology follow-up is indicated unless new findings present at a later date or other questions arise.     I explained the above to Yulia and her mom and answered their questions. Please call me if you have any questions about today's visit, and thank you for letting me see Yulia with you. 45 minutes total--chart review, history, physical, testing interpretation, and discussion with Yulia and her mom.      Blaine Cuenca MD  Pediatric Cardiology       [facility]

## 2024-09-28 ENCOUNTER — EMERGENCY (EMERGENCY)
Facility: HOSPITAL | Age: 74
LOS: 0 days | Discharge: HOME | End: 2024-09-28
Attending: EMERGENCY MEDICINE
Payer: MEDICARE

## 2024-09-28 VITALS
DIASTOLIC BLOOD PRESSURE: 60 MMHG | RESPIRATION RATE: 18 BRPM | HEART RATE: 66 BPM | SYSTOLIC BLOOD PRESSURE: 133 MMHG | OXYGEN SATURATION: 97 % | TEMPERATURE: 98 F

## 2024-09-28 DIAGNOSIS — Z88.9 ALLERGY STATUS TO UNSPECIFIED DRUGS, MEDICAMENTS AND BIOLOGICAL SUBSTANCES: ICD-10-CM

## 2024-09-28 DIAGNOSIS — Z98.890 OTHER SPECIFIED POSTPROCEDURAL STATES: Chronic | ICD-10-CM

## 2024-09-28 DIAGNOSIS — R11.0 NAUSEA: ICD-10-CM

## 2024-09-28 DIAGNOSIS — R61 GENERALIZED HYPERHIDROSIS: ICD-10-CM

## 2024-09-28 DIAGNOSIS — I10 ESSENTIAL (PRIMARY) HYPERTENSION: ICD-10-CM

## 2024-09-28 DIAGNOSIS — Z96.7 PRESENCE OF OTHER BONE AND TENDON IMPLANTS: Chronic | ICD-10-CM

## 2024-09-28 DIAGNOSIS — R12 HEARTBURN: ICD-10-CM

## 2024-09-28 DIAGNOSIS — R07.89 OTHER CHEST PAIN: ICD-10-CM

## 2024-09-28 DIAGNOSIS — M54.2 CERVICALGIA: ICD-10-CM

## 2024-09-28 LAB
ALBUMIN SERPL ELPH-MCNC: 3.2 G/DL — LOW (ref 3.3–5)
ALP SERPL-CCNC: 60 U/L — SIGNIFICANT CHANGE UP (ref 40–120)
ALT FLD-CCNC: 14 U/L — SIGNIFICANT CHANGE UP (ref 12–78)
ANION GAP SERPL CALC-SCNC: 4 MMOL/L — LOW (ref 5–17)
AST SERPL-CCNC: 11 U/L — LOW (ref 15–37)
BASOPHILS # BLD AUTO: 0.05 K/UL — SIGNIFICANT CHANGE UP (ref 0–0.2)
BASOPHILS NFR BLD AUTO: 0.7 % — SIGNIFICANT CHANGE UP (ref 0–2)
BILIRUB SERPL-MCNC: 0.3 MG/DL — SIGNIFICANT CHANGE UP (ref 0.2–1.2)
BUN SERPL-MCNC: 12 MG/DL — SIGNIFICANT CHANGE UP (ref 7–23)
CALCIUM SERPL-MCNC: 9.7 MG/DL — SIGNIFICANT CHANGE UP (ref 8.5–10.1)
CHLORIDE SERPL-SCNC: 107 MMOL/L — SIGNIFICANT CHANGE UP (ref 96–108)
CO2 SERPL-SCNC: 28 MMOL/L — SIGNIFICANT CHANGE UP (ref 22–31)
CREAT SERPL-MCNC: 0.47 MG/DL — LOW (ref 0.5–1.3)
EGFR: 101 ML/MIN/1.73M2 — SIGNIFICANT CHANGE UP
EGFR: 101 ML/MIN/1.73M2 — SIGNIFICANT CHANGE UP
EOSINOPHIL # BLD AUTO: 0.07 K/UL — SIGNIFICANT CHANGE UP (ref 0–0.5)
EOSINOPHIL NFR BLD AUTO: 0.9 % — SIGNIFICANT CHANGE UP (ref 0–6)
GLUCOSE SERPL-MCNC: 113 MG/DL — HIGH (ref 70–99)
HCT VFR BLD CALC: 37.2 % — SIGNIFICANT CHANGE UP (ref 34.5–45)
HGB BLD-MCNC: 11.9 G/DL — SIGNIFICANT CHANGE UP (ref 11.5–15.5)
IMM GRANULOCYTES NFR BLD AUTO: 0.1 % — SIGNIFICANT CHANGE UP (ref 0–0.9)
LYMPHOCYTES # BLD AUTO: 2.29 K/UL — SIGNIFICANT CHANGE UP (ref 1–3.3)
LYMPHOCYTES # BLD AUTO: 30.1 % — SIGNIFICANT CHANGE UP (ref 13–44)
MCHC RBC-ENTMCNC: 27.9 PG — SIGNIFICANT CHANGE UP (ref 27–34)
MCHC RBC-ENTMCNC: 32 GM/DL — SIGNIFICANT CHANGE UP (ref 32–36)
MCV RBC AUTO: 87.3 FL — SIGNIFICANT CHANGE UP (ref 80–100)
MONOCYTES # BLD AUTO: 0.52 K/UL — SIGNIFICANT CHANGE UP (ref 0–0.9)
MONOCYTES NFR BLD AUTO: 6.8 % — SIGNIFICANT CHANGE UP (ref 2–14)
NEUTROPHILS # BLD AUTO: 4.66 K/UL — SIGNIFICANT CHANGE UP (ref 1.8–7.4)
NEUTROPHILS NFR BLD AUTO: 61.4 % — SIGNIFICANT CHANGE UP (ref 43–77)
PLATELET # BLD AUTO: 205 K/UL — SIGNIFICANT CHANGE UP (ref 150–400)
POTASSIUM SERPL-MCNC: 3.7 MMOL/L — SIGNIFICANT CHANGE UP (ref 3.5–5.3)
POTASSIUM SERPL-SCNC: 3.7 MMOL/L — SIGNIFICANT CHANGE UP (ref 3.5–5.3)
PROT SERPL-MCNC: 6.9 GM/DL — SIGNIFICANT CHANGE UP (ref 6–8.3)
RBC # BLD: 4.26 M/UL — SIGNIFICANT CHANGE UP (ref 3.8–5.2)
RBC # FLD: 12.4 % — SIGNIFICANT CHANGE UP (ref 10.3–14.5)
SODIUM SERPL-SCNC: 139 MMOL/L — SIGNIFICANT CHANGE UP (ref 135–145)
TROPONIN I, HIGH SENSITIVITY RESULT: 5.36 NG/L — SIGNIFICANT CHANGE UP
TROPONIN I, HIGH SENSITIVITY RESULT: 5.77 NG/L — SIGNIFICANT CHANGE UP
WBC # BLD: 7.6 K/UL — SIGNIFICANT CHANGE UP (ref 3.8–10.5)
WBC # FLD AUTO: 7.6 K/UL — SIGNIFICANT CHANGE UP (ref 3.8–10.5)

## 2024-09-28 PROCEDURE — 99285 EMERGENCY DEPT VISIT HI MDM: CPT | Mod: 25

## 2024-09-28 PROCEDURE — 99285 EMERGENCY DEPT VISIT HI MDM: CPT

## 2024-09-28 PROCEDURE — 84484 ASSAY OF TROPONIN QUANT: CPT

## 2024-09-28 PROCEDURE — 85025 COMPLETE CBC W/AUTO DIFF WBC: CPT

## 2024-09-28 PROCEDURE — 71045 X-RAY EXAM CHEST 1 VIEW: CPT

## 2024-09-28 PROCEDURE — 80053 COMPREHEN METABOLIC PANEL: CPT

## 2024-09-28 PROCEDURE — 36000 PLACE NEEDLE IN VEIN: CPT

## 2024-09-28 PROCEDURE — 93005 ELECTROCARDIOGRAM TRACING: CPT

## 2024-09-28 PROCEDURE — 36415 COLL VENOUS BLD VENIPUNCTURE: CPT

## 2024-09-28 PROCEDURE — 71045 X-RAY EXAM CHEST 1 VIEW: CPT | Mod: 26

## 2024-09-28 PROCEDURE — 93010 ELECTROCARDIOGRAM REPORT: CPT

## 2024-12-09 ENCOUNTER — NON-APPOINTMENT (OUTPATIENT)
Age: 74
End: 2024-12-09

## 2025-01-15 ENCOUNTER — APPOINTMENT (OUTPATIENT)
Dept: FAMILY MEDICINE | Facility: CLINIC | Age: 75
End: 2025-01-15

## 2025-01-15 ENCOUNTER — NON-APPOINTMENT (OUTPATIENT)
Age: 75
End: 2025-01-15

## 2025-01-15 VITALS
HEART RATE: 70 BPM | HEIGHT: 63.5 IN | BODY MASS INDEX: 30.1 KG/M2 | DIASTOLIC BLOOD PRESSURE: 64 MMHG | SYSTOLIC BLOOD PRESSURE: 126 MMHG | WEIGHT: 172 LBS | OXYGEN SATURATION: 97 % | TEMPERATURE: 98 F

## 2025-01-15 PROCEDURE — 99213 OFFICE O/P EST LOW 20 MIN: CPT

## 2025-01-29 NOTE — PRE-OP CHECKLIST - TEMPERATURE IN CELSIUS (DEGREES C)
36.6 as tolerated     Hypertension  Patient's blood pressures currently running soft.  Lisinopril has been held and still low.  P.o. intake has not been great.  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  Skin lesions have worsened as she has been off steroids.  Dermatology has seen the patient and recommending Rituxan treatments.  Oncology has been consulted for initiation of that.  -Continue steroids for now  -Continue PJP prophylaxis  -Continue Keflex  -Continue with hydroxychloroquine  -Start Rituxan.  Hematology consulted    Type 2 diabetes mellitus  Most recent A1c 7.2%.  Glucose levels within supple range.  -Continue sliding scale, diabetic diet    Mood disorder  Mood stable today.  Does not appear to be overtly depressed.  Continue Cymbalta     Recent endocarditis  Patient completed treatment with vancomycin on 1/22/2025.       Diarrhea, r/o C diff  Has no history of recent antibiotic use.  Will need to rule out C. difficile.  However, diarrhea appears to be slowing down  -Follow-up C. difficile studies    Anticipated Discharge Arrangements:   Therapy has not evaluated yet    PT/OT evals ordered?  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  - 44.0 %    Monocytes % 1.9 (L) 4.0 - 12.0 %    Eosinophils % 0.2 (L) 0.5 - 7.8 %    Basophils % 0.2 0.0 - 2.0 %    Immature Granulocytes % 0.6 0.0 - 5.0 %    Neutrophils Absolute 5.40 1.70 - 8.20 K/UL    Lymphocytes Absolute 0.77 0.50 - 4.60 K/UL    Monocytes Absolute 0.12 0.10 - 1.30 K/UL    Eosinophils Absolute 0.01 0.00 - 0.80 K/UL    Basophils Absolute 0.01 0.00 - 0.20 K/UL    Immature Granulocytes Absolute 0.04 0.0 - 0.5 K/UL   POCT Glucose    Collection Time: 01/28/25  5:06 PM   Result Value Ref Range    POC Glucose 159 (H) 65 - 100 mg/dL    Performed by: Jammie    POCT Glucose    Collection Time: 01/28/25  8:36 PM   Result Value Ref Range    POC Glucose 209 (H) 65 - 100 mg/dL    Performed by: Zara    Hemoglobin A1c    Collection Time: 01/29/25  5:28 AM   Result Value Ref Range    Hemoglobin A1C 7.7 (H) 0 - 5.6 %    Estimated Avg Glucose 175 mg/dL   POCT Glucose    Collection Time: 01/29/25  7:45 AM   Result Value Ref Range    POC Glucose 158 (H) 65 - 100 mg/dL    Performed by: Sena    POCT Glucose    Collection Time: 01/29/25 11:22 AM   Result Value Ref Range    POC Glucose 139 (H) 65 - 100 mg/dL    Performed by: Sena        No results for input(s): \"COVID19\" in the last 72 hours.    Current Meds:  Current Facility-Administered Medications   Medication Dose Route Frequency    sulfamethoxazole-trimethoprim (BACTRIM DS;SEPTRA DS) 800-160 MG per tablet 1 tablet  1 tablet Oral Q MWF    [Held by provider] amLODIPine (NORVASC) tablet 5 mg  5 mg Oral Daily    DULoxetine (CYMBALTA) extended release capsule 60 mg  60 mg Oral Daily    ferrous sulfate (IRON 325) tablet 325 mg  325 mg Oral BID WC    HYDROcodone-acetaminophen (NORCO) 7.5-325 MG per tablet 1 tablet  1 tablet Oral Q6H PRN    hydroxychloroquine (PLAQUENIL) tablet 200 mg  200 mg Oral Daily    ipratropium 0.5 mg-albuterol 2.5 mg (DUONEB) nebulizer solution 1 Dose  1 Dose Inhalation Q4H RT    [Held by provider]

## 2025-02-05 ENCOUNTER — NON-APPOINTMENT (OUTPATIENT)
Age: 75
End: 2025-02-05

## 2025-02-05 ENCOUNTER — APPOINTMENT (OUTPATIENT)
Dept: FAMILY MEDICINE | Facility: CLINIC | Age: 75
End: 2025-02-05
Payer: MEDICARE

## 2025-02-05 VITALS
OXYGEN SATURATION: 96 % | BODY MASS INDEX: 34.36 KG/M2 | DIASTOLIC BLOOD PRESSURE: 60 MMHG | HEIGHT: 60 IN | TEMPERATURE: 98.6 F | HEART RATE: 67 BPM | WEIGHT: 175 LBS | SYSTOLIC BLOOD PRESSURE: 130 MMHG

## 2025-02-05 DIAGNOSIS — L20.9 ATOPIC DERMATITIS, UNSPECIFIED: ICD-10-CM

## 2025-02-05 DIAGNOSIS — N63.0 UNSPECIFIED LUMP IN UNSPECIFIED BREAST: ICD-10-CM

## 2025-02-05 DIAGNOSIS — N95.1 MENOPAUSAL AND FEMALE CLIMACTERIC STATES: ICD-10-CM

## 2025-02-05 DIAGNOSIS — Z00.00 ENCOUNTER FOR GENERAL ADULT MEDICAL EXAMINATION W/OUT ABNORMAL FINDINGS: ICD-10-CM

## 2025-02-05 PROCEDURE — 36415 COLL VENOUS BLD VENIPUNCTURE: CPT

## 2025-02-05 PROCEDURE — 93000 ELECTROCARDIOGRAM COMPLETE: CPT

## 2025-02-05 PROCEDURE — 81003 URINALYSIS AUTO W/O SCOPE: CPT | Mod: QW

## 2025-02-05 PROCEDURE — G0439: CPT

## 2025-02-05 PROCEDURE — 99213 OFFICE O/P EST LOW 20 MIN: CPT | Mod: 25

## 2025-02-05 RX ORDER — BUDESONIDE AND FORMOTEROL FUMARATE DIHYDRATE 80; 4.5 UG/1; UG/1
80-4.5 AEROSOL RESPIRATORY (INHALATION) TWICE DAILY
Qty: 1 | Refills: 1 | Status: ACTIVE | COMMUNITY
Start: 2025-02-05 | End: 1900-01-01

## 2025-02-06 PROBLEM — N95.1 POST MENOPAUSAL SYNDROME: Status: ACTIVE | Noted: 2025-02-06

## 2025-02-06 LAB
25(OH)D3 SERPL-MCNC: 33.6 NG/ML
ALBUMIN SERPL ELPH-MCNC: 4.1 G/DL
ALP BLD-CCNC: 64 U/L
ALT SERPL-CCNC: 11 U/L
ANION GAP SERPL CALC-SCNC: 12 MMOL/L
APPEARANCE: CLEAR
AST SERPL-CCNC: 17 U/L
BACTERIA: NEGATIVE /HPF
BILIRUB SERPL-MCNC: 0.2 MG/DL
BILIRUBIN URINE: NEGATIVE
BLOOD URINE: NEGATIVE
BUN SERPL-MCNC: 10 MG/DL
CALCIUM SERPL-MCNC: 9.6 MG/DL
CAST: 0 /LPF
CHLORIDE SERPL-SCNC: 103 MMOL/L
CHOLEST SERPL-MCNC: 191 MG/DL
CO2 SERPL-SCNC: 25 MMOL/L
COLOR: YELLOW
CREAT SERPL-MCNC: 0.49 MG/DL
EGFR: 99 ML/MIN/1.73M2
EPITHELIAL CELLS: 2 /HPF
ESTIMATED AVERAGE GLUCOSE: 120 MG/DL
GLUCOSE QUALITATIVE U: NEGATIVE MG/DL
GLUCOSE SERPL-MCNC: 91 MG/DL
HBA1C MFR BLD HPLC: 5.8 %
HCT VFR BLD CALC: 37.7 %
HDLC SERPL-MCNC: 72 MG/DL
HGB BLD-MCNC: 11.6 G/DL
KETONES URINE: NEGATIVE MG/DL
LDLC SERPL CALC-MCNC: 100 MG/DL
LEUKOCYTE ESTERASE URINE: NEGATIVE
MCHC RBC-ENTMCNC: 27.6 PG
MCHC RBC-ENTMCNC: 30.8 G/DL
MCV RBC AUTO: 89.8 FL
MICROSCOPIC-UA: NORMAL
NITRITE URINE: NEGATIVE
NONHDLC SERPL-MCNC: 120 MG/DL
PH URINE: 7
PLATELET # BLD AUTO: 223 K/UL
POTASSIUM SERPL-SCNC: 4.2 MMOL/L
PROT SERPL-MCNC: 6.5 G/DL
PROTEIN URINE: NEGATIVE MG/DL
RBC # BLD: 4.2 M/UL
RBC # FLD: 12.7 %
RED BLOOD CELLS URINE: 1 /HPF
SODIUM SERPL-SCNC: 140 MMOL/L
SPECIFIC GRAVITY URINE: 1.01
T3FREE SERPL-MCNC: 3.43 PG/ML
T4 FREE SERPL-MCNC: 1.2 NG/DL
TRIGL SERPL-MCNC: 109 MG/DL
TSH SERPL-ACNC: 0.79 UIU/ML
UROBILINOGEN URINE: 0.2 MG/DL
WBC # FLD AUTO: 6.53 K/UL
WHITE BLOOD CELLS URINE: 0 /HPF

## 2025-02-06 RX ORDER — TRIAMCINOLONE ACETONIDE 1 MG/G
0.1 CREAM TOPICAL TWICE DAILY
Qty: 1 | Refills: 1 | Status: ACTIVE | COMMUNITY
Start: 2025-02-06 | End: 1900-01-01

## 2025-02-11 ENCOUNTER — APPOINTMENT (OUTPATIENT)
Dept: OBGYN | Facility: CLINIC | Age: 75
End: 2025-02-11
Payer: MEDICARE

## 2025-02-11 VITALS
HEIGHT: 60 IN | BODY MASS INDEX: 34.16 KG/M2 | SYSTOLIC BLOOD PRESSURE: 124 MMHG | WEIGHT: 174 LBS | DIASTOLIC BLOOD PRESSURE: 62 MMHG

## 2025-02-11 DIAGNOSIS — B96.89 ACUTE VAGINITIS: ICD-10-CM

## 2025-02-11 DIAGNOSIS — N76.0 ACUTE VAGINITIS: ICD-10-CM

## 2025-02-11 DIAGNOSIS — Z01.419 ENCOUNTER FOR GYNECOLOGICAL EXAMINATION (GENERAL) (ROUTINE) W/OUT ABNORMAL FINDINGS: ICD-10-CM

## 2025-02-11 LAB
CARD LOT #: NORMAL
CARD LOT EXP DATE: NORMAL
DATE COLLECTED: NORMAL
DATE COLLECTED: NORMAL
DEVELOPER LOT #: NORMAL
DEVELOPER LOT EXP DATE: NORMAL
HEMOCCULT 2: NEGATIVE
HEMOCCULT SP1 STL QL: NEGATIVE
QUALITY CONTROL: YES
QUALITY CONTROL: YES

## 2025-02-11 PROCEDURE — G0101: CPT

## 2025-02-11 PROCEDURE — 82270 OCCULT BLOOD FECES: CPT

## 2025-02-11 PROCEDURE — 99387 INIT PM E/M NEW PAT 65+ YRS: CPT | Mod: GY

## 2025-02-11 RX ORDER — ESTROGENS, CONJUGATED 0.62 MG/1
0.62 TABLET, FILM COATED ORAL DAILY
Qty: 90 | Refills: 3 | Status: ACTIVE | COMMUNITY
Start: 2025-02-05 | End: 1900-01-01

## 2025-02-14 PROBLEM — N76.0 BACTERIAL VAGINOSIS: Status: ACTIVE | Noted: 2025-02-14 | Resolved: 2025-03-16

## 2025-02-14 RX ORDER — METRONIDAZOLE 7.5 MG/G
0.75 GEL VAGINAL
Qty: 1 | Refills: 0 | Status: ACTIVE | COMMUNITY
Start: 2025-02-14 | End: 1900-01-01

## 2025-02-28 ENCOUNTER — RX CHANGE (OUTPATIENT)
Age: 75
End: 2025-02-28

## 2025-03-03 RX ORDER — BUDESONIDE AND FORMOTEROL FUMARATE DIHYDRATE 80; 4.5 UG/1; UG/1
80-4.5 AEROSOL RESPIRATORY (INHALATION) TWICE DAILY
Qty: 3 | Refills: 1 | Status: ACTIVE | COMMUNITY
Start: 1900-01-01 | End: 1900-01-01

## 2025-03-27 ENCOUNTER — APPOINTMENT (OUTPATIENT)
Dept: UROGYNECOLOGY | Facility: CLINIC | Age: 75
End: 2025-03-27

## 2025-03-27 DIAGNOSIS — N32.81 OVERACTIVE BLADDER: ICD-10-CM

## 2025-03-27 LAB
BILIRUB UR QL STRIP: NEGATIVE
CLARITY UR: CLEAR
COLLECTION METHOD: NORMAL
GLUCOSE UR-MCNC: NEGATIVE
HCG UR QL: 0.2 EU/DL
HGB UR QL STRIP.AUTO: NORMAL
KETONES UR-MCNC: NORMAL
LEUKOCYTE ESTERASE UR QL STRIP: NEGATIVE
NITRITE UR QL STRIP: NEGATIVE
PH UR STRIP: 5.5
PROT UR STRIP-MCNC: NORMAL
SP GR UR STRIP: 1.02

## 2025-03-27 PROCEDURE — 99459 PELVIC EXAMINATION: CPT

## 2025-03-27 PROCEDURE — 99204 OFFICE O/P NEW MOD 45 MIN: CPT | Mod: 25

## 2025-03-27 PROCEDURE — 81003 URINALYSIS AUTO W/O SCOPE: CPT | Mod: QW

## 2025-03-27 RX ORDER — VIBEGRON 75 MG/1
75 TABLET, FILM COATED ORAL
Qty: 90 | Refills: 0 | Status: ACTIVE | COMMUNITY
Start: 2025-03-27 | End: 1900-01-01

## 2025-03-28 LAB
APPEARANCE: CLEAR
BACTERIA: NEGATIVE /HPF
BILIRUBIN URINE: NEGATIVE
BLOOD URINE: NEGATIVE
CAST: 2 /LPF
COLOR: YELLOW
EPITHELIAL CELLS: 2 /HPF
GLUCOSE QUALITATIVE U: NEGATIVE MG/DL
KETONES URINE: NEGATIVE MG/DL
LEUKOCYTE ESTERASE URINE: NEGATIVE
MICROSCOPIC-UA: NORMAL
NITRITE URINE: NEGATIVE
PH URINE: 6
PROTEIN URINE: NEGATIVE MG/DL
RED BLOOD CELLS URINE: 2 /HPF
REVIEW: NORMAL
SPECIFIC GRAVITY URINE: 1.02
UROBILINOGEN URINE: 0.2 MG/DL
WHITE BLOOD CELLS URINE: 0 /HPF

## 2025-03-31 LAB — BACTERIA UR CULT: NORMAL

## 2025-04-16 ENCOUNTER — RX RENEWAL (OUTPATIENT)
Age: 75
End: 2025-04-16

## 2025-04-17 RX ORDER — LISINOPRIL 5 MG/1
5 TABLET ORAL
Qty: 90 | Refills: 0 | Status: ACTIVE | COMMUNITY
Start: 2025-04-16 | End: 1900-01-01

## 2025-04-17 RX ORDER — METOPROLOL TARTRATE 25 MG/1
25 TABLET ORAL
Qty: 180 | Refills: 0 | Status: ACTIVE | COMMUNITY
Start: 2025-04-16 | End: 1900-01-01

## 2025-04-24 ENCOUNTER — APPOINTMENT (OUTPATIENT)
Dept: UROGYNECOLOGY | Facility: CLINIC | Age: 75
End: 2025-04-24

## 2025-04-24 VITALS
OXYGEN SATURATION: 94 % | HEIGHT: 60 IN | SYSTOLIC BLOOD PRESSURE: 152 MMHG | HEART RATE: 82 BPM | BODY MASS INDEX: 34.16 KG/M2 | DIASTOLIC BLOOD PRESSURE: 80 MMHG | WEIGHT: 174 LBS

## 2025-04-24 PROCEDURE — 51798 US URINE CAPACITY MEASURE: CPT

## 2025-04-24 PROCEDURE — 99213 OFFICE O/P EST LOW 20 MIN: CPT

## 2025-05-06 ENCOUNTER — NON-APPOINTMENT (OUTPATIENT)
Age: 75
End: 2025-05-06

## 2025-05-07 ENCOUNTER — APPOINTMENT (OUTPATIENT)
Dept: FAMILY MEDICINE | Facility: CLINIC | Age: 75
End: 2025-05-07

## 2025-05-07 VITALS
BODY MASS INDEX: 31.89 KG/M2 | HEART RATE: 64 BPM | WEIGHT: 180 LBS | TEMPERATURE: 98 F | HEIGHT: 63 IN | SYSTOLIC BLOOD PRESSURE: 100 MMHG | OXYGEN SATURATION: 96 % | DIASTOLIC BLOOD PRESSURE: 60 MMHG

## 2025-05-07 DIAGNOSIS — N32.81 OVERACTIVE BLADDER: ICD-10-CM

## 2025-05-07 DIAGNOSIS — H26.9 UNSPECIFIED CATARACT: ICD-10-CM

## 2025-05-07 DIAGNOSIS — F17.210 NICOTINE DEPENDENCE, CIGARETTES, UNCOMPLICATED: ICD-10-CM

## 2025-05-07 DIAGNOSIS — I10 ESSENTIAL (PRIMARY) HYPERTENSION: ICD-10-CM

## 2025-05-07 DIAGNOSIS — Z23 ENCOUNTER FOR IMMUNIZATION: ICD-10-CM

## 2025-05-07 DIAGNOSIS — R73.03 PREDIABETES.: ICD-10-CM

## 2025-05-07 DIAGNOSIS — Z87.891 PERSONAL HISTORY OF NICOTINE DEPENDENCE: ICD-10-CM

## 2025-05-07 PROCEDURE — 90715 TDAP VACCINE 7 YRS/> IM: CPT

## 2025-05-07 PROCEDURE — 90471 IMMUNIZATION ADMIN: CPT

## 2025-05-07 PROCEDURE — 36415 COLL VENOUS BLD VENIPUNCTURE: CPT

## 2025-05-07 PROCEDURE — 99214 OFFICE O/P EST MOD 30 MIN: CPT | Mod: 25

## 2025-05-07 RX ORDER — OXYCODONE HYDROCHLORIDE AND ACETAMINOPHEN 10; 325 MG/1; MG/1
10-325 TABLET ORAL
Refills: 0 | Status: ACTIVE | COMMUNITY

## 2025-05-07 RX ORDER — METHADONE HYDROCHLORIDE 5 MG/1
5 TABLET ORAL
Refills: 0 | Status: ACTIVE | COMMUNITY

## 2025-05-12 LAB
ALBUMIN SERPL ELPH-MCNC: 3.9 G/DL
ALP BLD-CCNC: 61 U/L
ALT SERPL-CCNC: 13 U/L
ANION GAP SERPL CALC-SCNC: 16 MMOL/L
AST SERPL-CCNC: 17 U/L
BILIRUB DIRECT SERPL-MCNC: 0.1 MG/DL
BILIRUB INDIRECT SERPL-MCNC: 0.2 MG/DL
BILIRUB SERPL-MCNC: 0.3 MG/DL
BUN SERPL-MCNC: 10 MG/DL
CALCIUM SERPL-MCNC: 10 MG/DL
CHLORIDE SERPL-SCNC: 105 MMOL/L
CO2 SERPL-SCNC: 22 MMOL/L
CREAT SERPL-MCNC: 0.6 MG/DL
EGFRCR SERPLBLD CKD-EPI 2021: 95 ML/MIN/1.73M2
ESTIMATED AVERAGE GLUCOSE: 120 MG/DL
GLUCOSE SERPL-MCNC: 120 MG/DL
HBA1C MFR BLD HPLC: 5.8 %
HCT VFR BLD CALC: 34.9 %
HGB BLD-MCNC: 11.4 G/DL
MCHC RBC-ENTMCNC: 28.2 PG
MCHC RBC-ENTMCNC: 32.7 G/DL
MCV RBC AUTO: 86.4 FL
PLATELET # BLD AUTO: 229 K/UL
POTASSIUM SERPL-SCNC: 4 MMOL/L
PROT SERPL-MCNC: 6.3 G/DL
RBC # BLD: 4.04 M/UL
RBC # FLD: 12.5 %
SODIUM SERPL-SCNC: 142 MMOL/L
WBC # FLD AUTO: 6.37 K/UL

## 2025-05-16 ENCOUNTER — RESULT REVIEW (OUTPATIENT)
Age: 75
End: 2025-05-16

## 2025-05-16 ENCOUNTER — APPOINTMENT (OUTPATIENT)
Dept: MAMMOGRAPHY | Facility: CLINIC | Age: 75
End: 2025-05-16
Payer: MEDICARE

## 2025-05-16 ENCOUNTER — OUTPATIENT (OUTPATIENT)
Dept: OUTPATIENT SERVICES | Facility: HOSPITAL | Age: 75
LOS: 1 days | End: 2025-05-16
Payer: MEDICARE

## 2025-05-16 ENCOUNTER — APPOINTMENT (OUTPATIENT)
Dept: ULTRASOUND IMAGING | Facility: CLINIC | Age: 75
End: 2025-05-16
Payer: MEDICARE

## 2025-05-16 DIAGNOSIS — Z98.890 OTHER SPECIFIED POSTPROCEDURAL STATES: Chronic | ICD-10-CM

## 2025-05-16 DIAGNOSIS — Z00.00 ENCOUNTER FOR GENERAL ADULT MEDICAL EXAMINATION WITHOUT ABNORMAL FINDINGS: ICD-10-CM

## 2025-05-16 DIAGNOSIS — Z00.8 ENCOUNTER FOR OTHER GENERAL EXAMINATION: ICD-10-CM

## 2025-05-16 DIAGNOSIS — N63.0 UNSPECIFIED LUMP IN UNSPECIFIED BREAST: ICD-10-CM

## 2025-05-16 DIAGNOSIS — Z96.7 PRESENCE OF OTHER BONE AND TENDON IMPLANTS: Chronic | ICD-10-CM

## 2025-05-16 PROCEDURE — 77063 BREAST TOMOSYNTHESIS BI: CPT | Mod: 26

## 2025-05-16 PROCEDURE — 76641 ULTRASOUND BREAST COMPLETE: CPT | Mod: 26,50

## 2025-05-16 PROCEDURE — 77067 SCR MAMMO BI INCL CAD: CPT | Mod: 26

## 2025-05-16 PROCEDURE — 77063 BREAST TOMOSYNTHESIS BI: CPT

## 2025-05-16 PROCEDURE — 77067 SCR MAMMO BI INCL CAD: CPT

## 2025-05-16 PROCEDURE — 76641 ULTRASOUND BREAST COMPLETE: CPT

## 2025-05-28 ENCOUNTER — RX RENEWAL (OUTPATIENT)
Age: 75
End: 2025-05-28

## 2025-06-04 DIAGNOSIS — I10 ESSENTIAL (PRIMARY) HYPERTENSION: ICD-10-CM

## 2025-06-13 ENCOUNTER — APPOINTMENT (OUTPATIENT)
Dept: INTERNAL MEDICINE | Facility: CLINIC | Age: 75
End: 2025-06-13
Payer: MEDICARE

## 2025-06-13 VITALS
BODY MASS INDEX: 30.62 KG/M2 | HEART RATE: 67 BPM | WEIGHT: 175 LBS | OXYGEN SATURATION: 96 % | HEIGHT: 63.5 IN | TEMPERATURE: 98.2 F | RESPIRATION RATE: 16 BRPM | DIASTOLIC BLOOD PRESSURE: 77 MMHG | SYSTOLIC BLOOD PRESSURE: 142 MMHG

## 2025-06-13 PROBLEM — R06.09 DYSPNEA ON EXERTION: Status: ACTIVE | Noted: 2025-06-13

## 2025-06-13 PROBLEM — J44.9 COPD, MODERATE: Status: ACTIVE | Noted: 2025-06-13

## 2025-06-13 PROBLEM — F17.200 CURRENT SMOKER: Status: ACTIVE | Noted: 2025-06-13

## 2025-06-13 PROCEDURE — 94727 GAS DIL/WSHOT DETER LNG VOL: CPT

## 2025-06-13 PROCEDURE — 94060 EVALUATION OF WHEEZING: CPT

## 2025-06-13 PROCEDURE — 99204 OFFICE O/P NEW MOD 45 MIN: CPT | Mod: 25

## 2025-06-13 PROCEDURE — 94729 DIFFUSING CAPACITY: CPT

## 2025-06-13 RX ORDER — BUPRENORPHINE HYDROCHLORIDE 300 UG/1
300 FILM, SOLUBLE BUCCAL
Refills: 0 | Status: ACTIVE | COMMUNITY

## 2025-06-13 RX ORDER — FLUTICASONE FUROATE, UMECLIDINIUM BROMIDE AND VILANTEROL TRIFENATATE 200; 62.5; 25 UG/1; UG/1; UG/1
200-62.5-25 POWDER RESPIRATORY (INHALATION)
Qty: 1 | Refills: 3 | Status: ACTIVE | COMMUNITY
Start: 2025-06-13 | End: 1900-01-01

## 2025-07-15 ENCOUNTER — NON-APPOINTMENT (OUTPATIENT)
Age: 75
End: 2025-07-15

## 2025-07-15 ENCOUNTER — APPOINTMENT (OUTPATIENT)
Dept: THORACIC SURGERY | Facility: CLINIC | Age: 75
End: 2025-07-15
Payer: MEDICARE

## 2025-07-15 VITALS — BODY MASS INDEX: 30.8 KG/M2 | WEIGHT: 176 LBS | HEIGHT: 63.5 IN

## 2025-07-15 PROBLEM — F17.210 CIGARETTE SMOKER: Status: ACTIVE | Noted: 2025-07-15

## 2025-07-15 PROCEDURE — G0296 VISIT TO DETERM LDCT ELIG: CPT

## 2025-07-16 ENCOUNTER — OUTPATIENT (OUTPATIENT)
Dept: OUTPATIENT SERVICES | Facility: HOSPITAL | Age: 75
LOS: 1 days | End: 2025-07-16
Payer: MEDICARE

## 2025-07-16 ENCOUNTER — APPOINTMENT (OUTPATIENT)
Dept: ULTRASOUND IMAGING | Facility: CLINIC | Age: 75
End: 2025-07-16
Payer: MEDICARE

## 2025-07-16 DIAGNOSIS — Z98.890 OTHER SPECIFIED POSTPROCEDURAL STATES: Chronic | ICD-10-CM

## 2025-07-16 DIAGNOSIS — Z00.8 ENCOUNTER FOR OTHER GENERAL EXAMINATION: ICD-10-CM

## 2025-07-16 PROCEDURE — 76536 US EXAM OF HEAD AND NECK: CPT

## 2025-07-16 PROCEDURE — 76536 US EXAM OF HEAD AND NECK: CPT | Mod: 26

## 2025-07-30 ENCOUNTER — APPOINTMENT (OUTPATIENT)
Dept: CT IMAGING | Facility: CLINIC | Age: 75
End: 2025-07-30

## 2025-08-21 ENCOUNTER — APPOINTMENT (OUTPATIENT)
Dept: CT IMAGING | Facility: CLINIC | Age: 75
End: 2025-08-21

## 2025-08-21 ENCOUNTER — OUTPATIENT (OUTPATIENT)
Dept: OUTPATIENT SERVICES | Facility: HOSPITAL | Age: 75
LOS: 1 days | End: 2025-08-21
Payer: MEDICARE

## 2025-08-21 DIAGNOSIS — Z98.890 OTHER SPECIFIED POSTPROCEDURAL STATES: Chronic | ICD-10-CM

## 2025-08-21 DIAGNOSIS — Z00.8 ENCOUNTER FOR OTHER GENERAL EXAMINATION: ICD-10-CM

## 2025-08-21 DIAGNOSIS — F17.210 NICOTINE DEPENDENCE, CIGARETTES, UNCOMPLICATED: ICD-10-CM

## 2025-08-21 DIAGNOSIS — Z96.7 PRESENCE OF OTHER BONE AND TENDON IMPLANTS: Chronic | ICD-10-CM

## 2025-08-21 PROCEDURE — 71271 CT THORAX LUNG CANCER SCR C-: CPT | Mod: 26

## 2025-08-21 PROCEDURE — 71271 CT THORAX LUNG CANCER SCR C-: CPT
